# Patient Record
Sex: FEMALE | Race: WHITE | NOT HISPANIC OR LATINO | Employment: OTHER | URBAN - METROPOLITAN AREA
[De-identification: names, ages, dates, MRNs, and addresses within clinical notes are randomized per-mention and may not be internally consistent; named-entity substitution may affect disease eponyms.]

---

## 2017-09-18 ENCOUNTER — TRANSCRIBE ORDERS (OUTPATIENT)
Dept: ADMINISTRATIVE | Facility: HOSPITAL | Age: 62
End: 2017-09-18

## 2017-09-18 ENCOUNTER — HOSPITAL ENCOUNTER (OUTPATIENT)
Dept: GENERAL RADIOLOGY | Facility: HOSPITAL | Age: 62
Discharge: HOME | End: 2017-09-18
Admitting: NURSE PRACTITIONER

## 2017-09-18 DIAGNOSIS — M79.672 ACUTE FOOT PAIN, LEFT: ICD-10-CM

## 2017-09-18 DIAGNOSIS — M79.672 ACUTE FOOT PAIN, LEFT: Primary | ICD-10-CM

## 2017-09-18 PROCEDURE — 73630 X-RAY EXAM OF FOOT: CPT

## 2017-12-28 ENCOUNTER — TRANSCRIBE ORDERS (OUTPATIENT)
Dept: ADMINISTRATIVE | Facility: HOSPITAL | Age: 62
End: 2017-12-28

## 2017-12-28 ENCOUNTER — HOSPITAL ENCOUNTER (OUTPATIENT)
Dept: GENERAL RADIOLOGY | Facility: HOSPITAL | Age: 62
Discharge: HOME | End: 2017-12-28
Admitting: NURSE PRACTITIONER

## 2017-12-28 DIAGNOSIS — J40 BRONCHITIS: ICD-10-CM

## 2017-12-28 DIAGNOSIS — J40 BRONCHITIS: Primary | ICD-10-CM

## 2017-12-28 PROCEDURE — 71020 HC CHEST PA AND LATERAL: CPT

## 2018-06-08 ENCOUNTER — TRANSCRIBE ORDERS (OUTPATIENT)
Dept: ADMINISTRATIVE | Facility: HOSPITAL | Age: 63
End: 2018-06-08

## 2018-06-08 DIAGNOSIS — Z12.31 VISIT FOR SCREENING MAMMOGRAM: Primary | ICD-10-CM

## 2018-06-21 ENCOUNTER — HOSPITAL ENCOUNTER (OUTPATIENT)
Dept: MAMMOGRAPHY | Facility: HOSPITAL | Age: 63
Discharge: HOME | End: 2018-06-21
Admitting: NURSE PRACTITIONER

## 2018-06-21 DIAGNOSIS — Z12.31 VISIT FOR SCREENING MAMMOGRAM: ICD-10-CM

## 2018-06-21 PROCEDURE — 77067 SCR MAMMO BI INCL CAD: CPT | Performed by: RADIOLOGY

## 2018-06-21 PROCEDURE — 77067 SCR MAMMO BI INCL CAD: CPT

## 2018-06-21 PROCEDURE — 77063 BREAST TOMOSYNTHESIS BI: CPT

## 2018-06-21 PROCEDURE — 77063 BREAST TOMOSYNTHESIS BI: CPT | Performed by: RADIOLOGY

## 2018-09-06 ENCOUNTER — HOSPITAL ENCOUNTER (OUTPATIENT)
Dept: CT IMAGING | Facility: HOSPITAL | Age: 63
Discharge: HOME | End: 2018-09-06
Admitting: NURSE PRACTITIONER

## 2018-09-06 ENCOUNTER — TRANSCRIBE ORDERS (OUTPATIENT)
Dept: ADMINISTRATIVE | Facility: HOSPITAL | Age: 63
End: 2018-09-06

## 2018-09-06 DIAGNOSIS — R10.9 ABDOMINAL PAIN, UNSPECIFIED ABDOMINAL LOCATION: Primary | ICD-10-CM

## 2018-09-06 DIAGNOSIS — R10.9 ABDOMINAL PAIN, UNSPECIFIED ABDOMINAL LOCATION: ICD-10-CM

## 2018-09-06 PROCEDURE — 74176 CT ABD & PELVIS W/O CONTRAST: CPT

## 2019-07-31 ENCOUNTER — TRANSCRIBE ORDERS (OUTPATIENT)
Dept: ADMINISTRATIVE | Facility: HOSPITAL | Age: 64
End: 2019-07-31

## 2019-07-31 DIAGNOSIS — Z12.31 VISIT FOR SCREENING MAMMOGRAM: Primary | ICD-10-CM

## 2019-09-16 ENCOUNTER — APPOINTMENT (OUTPATIENT)
Dept: MAMMOGRAPHY | Facility: HOSPITAL | Age: 64
End: 2019-09-16

## 2019-10-31 ENCOUNTER — HOSPITAL ENCOUNTER (OUTPATIENT)
Dept: MAMMOGRAPHY | Facility: HOSPITAL | Age: 64
Discharge: HOME | End: 2019-10-31
Admitting: NURSE PRACTITIONER

## 2019-10-31 DIAGNOSIS — Z12.31 VISIT FOR SCREENING MAMMOGRAM: ICD-10-CM

## 2019-10-31 PROCEDURE — 77063 BREAST TOMOSYNTHESIS BI: CPT

## 2019-10-31 PROCEDURE — 77067 SCR MAMMO BI INCL CAD: CPT | Performed by: RADIOLOGY

## 2019-10-31 PROCEDURE — 77067 SCR MAMMO BI INCL CAD: CPT

## 2019-10-31 PROCEDURE — 77063 BREAST TOMOSYNTHESIS BI: CPT | Performed by: RADIOLOGY

## 2020-06-16 ENCOUNTER — HOSPITAL ENCOUNTER (OUTPATIENT)
Dept: GENERAL RADIOLOGY | Facility: HOSPITAL | Age: 65
Discharge: HOME | End: 2020-06-16
Admitting: NURSE PRACTITIONER

## 2020-06-16 ENCOUNTER — TRANSCRIBE ORDERS (OUTPATIENT)
Dept: ADMINISTRATIVE | Facility: HOSPITAL | Age: 65
End: 2020-06-16

## 2020-06-16 DIAGNOSIS — Z01.811 PRE-OP CHEST EXAM: Primary | ICD-10-CM

## 2020-06-16 DIAGNOSIS — Z01.811 PRE-OP CHEST EXAM: ICD-10-CM

## 2020-06-16 PROCEDURE — 71046 X-RAY EXAM CHEST 2 VIEWS: CPT

## 2021-10-28 ENCOUNTER — TRANSCRIBE ORDERS (OUTPATIENT)
Dept: ADMINISTRATIVE | Facility: HOSPITAL | Age: 66
End: 2021-10-28

## 2021-10-28 DIAGNOSIS — Z12.31 VISIT FOR SCREENING MAMMOGRAM: Primary | ICD-10-CM

## 2022-06-01 ENCOUNTER — TRANSCRIBE ORDERS (OUTPATIENT)
Dept: ADMINISTRATIVE | Facility: HOSPITAL | Age: 67
End: 2022-06-01

## 2022-06-01 DIAGNOSIS — Z12.31 VISIT FOR SCREENING MAMMOGRAM: Primary | ICD-10-CM

## 2022-06-23 ENCOUNTER — HOSPITAL ENCOUNTER (OUTPATIENT)
Dept: MAMMOGRAPHY | Facility: HOSPITAL | Age: 67
Discharge: HOME | End: 2022-06-23
Admitting: FAMILY MEDICINE

## 2022-06-23 DIAGNOSIS — Z12.31 VISIT FOR SCREENING MAMMOGRAM: ICD-10-CM

## 2022-06-23 PROCEDURE — 77063 BREAST TOMOSYNTHESIS BI: CPT | Performed by: RADIOLOGY

## 2022-06-23 PROCEDURE — 77063 BREAST TOMOSYNTHESIS BI: CPT

## 2022-06-23 PROCEDURE — 77067 SCR MAMMO BI INCL CAD: CPT | Performed by: RADIOLOGY

## 2022-06-23 PROCEDURE — 77067 SCR MAMMO BI INCL CAD: CPT

## 2022-07-19 ENCOUNTER — HOSPITAL ENCOUNTER (OUTPATIENT)
Dept: ULTRASOUND IMAGING | Facility: HOSPITAL | Age: 67
Discharge: HOME | End: 2022-07-19

## 2022-07-19 ENCOUNTER — HOSPITAL ENCOUNTER (OUTPATIENT)
Dept: MAMMOGRAPHY | Facility: HOSPITAL | Age: 67
Discharge: HOME | End: 2022-07-19

## 2022-07-19 DIAGNOSIS — R92.8 ABNORMAL MAMMOGRAM: ICD-10-CM

## 2022-07-19 PROCEDURE — 19083 BX BREAST 1ST LESION US IMAG: CPT | Performed by: RADIOLOGY

## 2022-07-19 PROCEDURE — 77065 DX MAMMO INCL CAD UNI: CPT | Performed by: RADIOLOGY

## 2022-07-19 PROCEDURE — 88360 TUMOR IMMUNOHISTOCHEM/MANUAL: CPT | Performed by: FAMILY MEDICINE

## 2022-07-19 PROCEDURE — 88342 IMHCHEM/IMCYTCHM 1ST ANTB: CPT | Performed by: FAMILY MEDICINE

## 2022-07-19 PROCEDURE — 88305 TISSUE EXAM BY PATHOLOGIST: CPT | Performed by: FAMILY MEDICINE

## 2022-07-19 PROCEDURE — A4648 IMPLANTABLE TISSUE MARKER: HCPCS

## 2022-07-19 PROCEDURE — 0 LIDOCAINE 1 % SOLUTION: Performed by: RADIOLOGY

## 2022-07-19 PROCEDURE — 88341 IMHCHEM/IMCYTCHM EA ADD ANTB: CPT | Performed by: FAMILY MEDICINE

## 2022-07-19 PROCEDURE — 76642 ULTRASOUND BREAST LIMITED: CPT

## 2022-07-19 PROCEDURE — 76642 ULTRASOUND BREAST LIMITED: CPT | Performed by: RADIOLOGY

## 2022-07-19 RX ORDER — LIDOCAINE HYDROCHLORIDE AND EPINEPHRINE 10; 10 MG/ML; UG/ML
20 INJECTION, SOLUTION INFILTRATION; PERINEURAL ONCE
Status: COMPLETED | OUTPATIENT
Start: 2022-07-19 | End: 2022-07-19

## 2022-07-19 RX ORDER — LIDOCAINE HYDROCHLORIDE 10 MG/ML
5 INJECTION, SOLUTION INFILTRATION; PERINEURAL ONCE
Status: COMPLETED | OUTPATIENT
Start: 2022-07-19 | End: 2022-07-19

## 2022-07-19 RX ADMIN — LIDOCAINE HYDROCHLORIDE AND EPINEPHRINE 3 ML: 10; 10 INJECTION, SOLUTION INFILTRATION; PERINEURAL at 10:03

## 2022-07-19 RX ADMIN — Medication 0.5 ML: at 10:02

## 2022-07-21 LAB
CYTO UR: NORMAL
LAB AP CASE REPORT: NORMAL
LAB AP CLINICAL INFORMATION: NORMAL
LAB AP DIAGNOSIS COMMENT: NORMAL
LAB AP SPECIAL STAINS: NORMAL
PATH REPORT.FINAL DX SPEC: NORMAL
PATH REPORT.GROSS SPEC: NORMAL

## 2022-07-25 ENCOUNTER — TELEPHONE (OUTPATIENT)
Dept: MAMMOGRAPHY | Facility: HOSPITAL | Age: 67
End: 2022-07-25

## 2022-07-25 NOTE — TELEPHONE ENCOUNTER
Referring provider's office notified pathology returned as cancer & patient will be notified. Patient notified of pathology results and recommendation. Verbalizes understanding. Denies discomfort. Denies signs and symptoms of infection.   Patient desires to research surgeon choice & possibly contact PCP about surgeon choice. Patient is to call back with decision; an appointment will then be scheduled and patient will be notified. Verbalizes understanding.

## 2022-07-27 ENCOUNTER — TELEPHONE (OUTPATIENT)
Dept: MAMMOGRAPHY | Facility: HOSPITAL | Age: 67
End: 2022-07-27

## 2022-08-03 ENCOUNTER — TELEPHONE (OUTPATIENT)
Dept: GENETICS | Facility: HOSPITAL | Age: 67
End: 2022-08-03

## 2022-08-03 ENCOUNTER — NURSE NAVIGATOR (OUTPATIENT)
Dept: ONCOLOGY | Facility: CLINIC | Age: 67
End: 2022-08-03

## 2022-08-03 NOTE — PROGRESS NOTES
Discussed genetic counseling and eligibility. Patient would like to proceed. Referral placed. All questions were answered and she will call with any concerns.

## 2022-08-03 NOTE — TELEPHONE ENCOUNTER
Spoke with the patient to setup a genetic appt for 8-10-22 at 1130 for a surgical decision for Dr. JUAREZ. Patient will do a blood draw at 9:15 prior to her Dr. JUAREZ appt at 9:30.

## 2022-08-09 ENCOUNTER — TELEPHONE (OUTPATIENT)
Dept: GENETICS | Facility: HOSPITAL | Age: 67
End: 2022-08-09

## 2022-08-09 NOTE — TELEPHONE ENCOUNTER
Left message asking patient to call me back regarding her family history/progeny prior to her genetic appt tomorrow.

## 2022-08-10 ENCOUNTER — TRANSCRIBE ORDERS (OUTPATIENT)
Dept: OCCUPATIONAL THERAPY | Facility: HOSPITAL | Age: 67
End: 2022-08-10

## 2022-08-10 ENCOUNTER — HOSPITAL ENCOUNTER (OUTPATIENT)
Dept: OCCUPATIONAL THERAPY | Facility: HOSPITAL | Age: 67
Setting detail: THERAPIES SERIES
Discharge: HOME | End: 2022-08-10

## 2022-08-10 ENCOUNTER — TRANSCRIBE ORDERS (OUTPATIENT)
Dept: LAB | Facility: HOSPITAL | Age: 67
End: 2022-08-10

## 2022-08-10 ENCOUNTER — CLINICAL SUPPORT (OUTPATIENT)
Dept: GENETICS | Facility: HOSPITAL | Age: 67
End: 2022-08-10

## 2022-08-10 ENCOUNTER — LAB (OUTPATIENT)
Dept: LAB | Facility: HOSPITAL | Age: 67
End: 2022-08-10

## 2022-08-10 DIAGNOSIS — Z80.49 FAMILY HISTORY OF MALIGNANT NEOPLASM OF UTERUS: ICD-10-CM

## 2022-08-10 DIAGNOSIS — C50.911 MALIGNANT NEOPLASM OF RIGHT BREAST IN FEMALE, ESTROGEN RECEPTOR POSITIVE, UNSPECIFIED SITE OF BREAST: Primary | ICD-10-CM

## 2022-08-10 DIAGNOSIS — Z13.79 GENETIC TESTING: Primary | ICD-10-CM

## 2022-08-10 DIAGNOSIS — C50.911 MALIGNANT NEOPLASM OF RIGHT FEMALE BREAST, UNSPECIFIED ESTROGEN RECEPTOR STATUS, UNSPECIFIED SITE OF BREAST: Primary | ICD-10-CM

## 2022-08-10 DIAGNOSIS — Z80.3 FAMILY HISTORY OF MALIGNANT NEOPLASM OF BREAST: ICD-10-CM

## 2022-08-10 DIAGNOSIS — Z17.0 MALIGNANT NEOPLASM OF RIGHT BREAST IN FEMALE, ESTROGEN RECEPTOR POSITIVE, UNSPECIFIED SITE OF BREAST: Primary | ICD-10-CM

## 2022-08-10 DIAGNOSIS — C50.911 MALIGNANT NEOPLASM OF RIGHT BREAST IN FEMALE, ESTROGEN RECEPTOR POSITIVE, UNSPECIFIED SITE OF BREAST: ICD-10-CM

## 2022-08-10 DIAGNOSIS — Z80.0 FAMILY HISTORY OF COLON CANCER: ICD-10-CM

## 2022-08-10 DIAGNOSIS — Z17.0 MALIGNANT NEOPLASM OF RIGHT BREAST IN FEMALE, ESTROGEN RECEPTOR POSITIVE, UNSPECIFIED SITE OF BREAST: ICD-10-CM

## 2022-08-10 PROCEDURE — 96040: CPT | Performed by: GENETIC COUNSELOR, MS

## 2022-08-10 NOTE — PROGRESS NOTES
Carol Nayak, a 66-year-old female, was seen for genetic counseling due to a personal history of breast cancer. Ms. Nayak was recently diagnosed with a right breast cancer at age 66. She is in the process of making a surgical decision. Ms. Nayak retains her uterus and ovaries. She has colonoscopies every five years and reports having a couple of polyps removed. She was interested in discussing her risk for a hereditary cancer syndrome.  Ms. Nayak was interested in pursuing a multi-gene panel to evaluate her risk of cancer, therefore the CancerNext panel was ordered through [facility] which analyzes BRCA1/2 and 34 additional genes associated with an increased cancer risk. Results from the high/moderate risk breast cancer genes are expected in 10 days, and results from the remainder of the panel are expected in 2-3 weeks.    PERTINENT FAMILY HISTORY: (See attached pedigree)   Sister:   Breast cancer, 40  Sister:   Breast cancer, 69  Mother:  Colon cancer, 58  Mat. Grandmother: Uterine cancer, 91  Pat. Grandmother: Lung cancer    Records regarding the family history were not provided for review.     RISK ASSESSMENT:  Ms. Nayak’s personal and family history of cancer led to concern for a hereditary cancer syndrome. We discussed BRCA1/2 testing as well as the option of pursuing a panel that would test for other genes known to impact cancer risk in addition to BRCA1/2.   Ms. Nayak clearly meets NCCN guidelines criteria for BRCA1/2 testing based on her personal and family history of breast cancer.     GENETIC COUNSELING: (30 minutes) We reviewed the family history information in detail. Cases of cancer follow three general patterns: sporadic, familial, and hereditary.  While most cancer is sporadic, some cases appear to occur in family clusters.  These cases are said to be familial and account for 10-20% of cancer cases.  Familial cases may be due to a combination of shared genes and environmental factors among  family members.  In even fewer families (5-10%), the cancer is said to be inherited, and the genes responsible for the cancer are known.      Family histories typical of hereditary cancer syndromes usually include multiple first- and second-degree relatives diagnosed with cancer types that define a syndrome.  These cases tend to be diagnosed at younger-than-expected ages and can be bilateral or multifocal.  The cancer in these families follows an autosomal dominant inheritance pattern, which indicates the likely presence of a mutation in a cancer susceptibility gene.  Children and siblings of an individual believed to carry this mutation have a 50% chance of inheriting that mutation, thereby inheriting the increased risk to develop cancer.  These mutations can be passed down from the maternal or the paternal lineage.    Hereditary breast cancer accounts for 5-10% of all cases of breast cancer.  A significant proportion of hereditary breast and ovarian cancer can be attributed to mutations in the BRCA1 and BRCA2 genes.  Mutations in these genes confer an increased risk for breast cancer, ovarian cancer, male breast cancer, prostate cancer, and pancreatic cancer. Women with a BRCA1 or BRCA2 mutation who have already been diagnosed with breast cancer have a 40-60% lifetime risk of a second breast cancer. Women with a BRCA1 or BRCA2 mutation have up to a 44% risk of ovarian cancer.      There are other genes that are known to be associated with an increased risk for cancer.  Some of these genes have well defined cancer risks and established management guidelines.  Other genes that can be tested for have been more recently described, and there may be less data regarding the risks and therefore may not have established management guidelines. We discussed these limitations at length.  Based on Ms. Nayak’s desire to get as much information as possible regarding her personal risks and potential risks for her family, she  opted to pursue testing through a panel that would look at several other genes known to increase the risk for cancer.    GENETIC TESTING:  The risks, benefits and limitations of genetic testing and implications for clinical management following testing were reviewed.  DNA test results can influence decisions regarding screening, prevention and surgical management.  Genetic testing can have significant psychological implications for both individuals and families.  Also discussed was the possibility of employment and insurance discrimination based on genetic test results and the laws in place to prevent this (GLENDA).    We discussed panel testing, which would involve testing for BRCA1/2 as well as 34 additional genes that are associated with increased cancer risk. The benefits and limitations of genetic testing were discussed and Ms. Nayak decided to pursue testing via the panel. The implications of a positive or negative test result were discussed. We discussed the possibility that, in some cases, genetic test results may be informative or may be ambiguous due to the identification of a genetic variant. These variants may or may not be associated with an increased cancer risk.  With multigene panel testing, it is not uncommon for a variant of uncertain significance (VUS) to be identified.  If a VUS is identified, testing family members is typically not recommended and screening recommendations are made based on the family history.  The laboratories that perform genetic testing work to reclassify the VUS and send out an amended report if and when a VUS is reclassified.  The majority of variant findings are ultimately reclassified to a negative result.  Given her personal and family history, a negative test result would not eliminate all cancer risk to her relatives, although the risk would not be as high as it would with positive genetic testing.      PLAN: Genetic testing via the CancerPrintLess Planst panel through [facility] was ordered. Results from the high/moderate risk breast cancer genes are expected in 7-10 days, and results from the remainder of the panel are expected in 2-3 weeks. Ms. Nayak is welcome to contact us in the meantime with any questions she may have at [phone].      Madina Rocha MS, [facility] [place], [facility]  Licensed Certified Genetic Counselor

## 2022-08-11 DIAGNOSIS — Z17.0 MALIGNANT NEOPLASM OF RIGHT BREAST IN FEMALE, ESTROGEN RECEPTOR POSITIVE, UNSPECIFIED SITE OF BREAST: Primary | ICD-10-CM

## 2022-08-11 DIAGNOSIS — C50.911 MALIGNANT NEOPLASM OF RIGHT BREAST IN FEMALE, ESTROGEN RECEPTOR POSITIVE, UNSPECIFIED SITE OF BREAST: Primary | ICD-10-CM

## 2022-08-15 ENCOUNTER — HOSPITAL ENCOUNTER (OUTPATIENT)
Dept: MRI IMAGING | Facility: HOSPITAL | Age: 67
Discharge: HOME | End: 2022-08-15
Admitting: SURGERY

## 2022-08-15 ENCOUNTER — DOCUMENTATION (OUTPATIENT)
Dept: GENETICS | Facility: HOSPITAL | Age: 67
End: 2022-08-15

## 2022-08-15 DIAGNOSIS — C50.411 MALIGNANT NEOPLASM OF UPPER-OUTER QUADRANT OF RIGHT FEMALE BREAST: ICD-10-CM

## 2022-08-15 LAB — CREAT BLDA-MCNC: 0.9 MG/DL (ref 0.6–1.3)

## 2022-08-15 PROCEDURE — 0 GADOBENATE DIMEGLUMINE 529 MG/ML SOLUTION: Performed by: SURGERY

## 2022-08-15 PROCEDURE — A9577 INJ MULTIHANCE: HCPCS | Performed by: SURGERY

## 2022-08-15 PROCEDURE — 77049 MRI BREAST C-+ W/CAD BI: CPT | Performed by: RADIOLOGY

## 2022-08-15 PROCEDURE — C8937 CAD BREAST MRI: HCPCS

## 2022-08-15 PROCEDURE — C8908 MRI W/O FOL W/CONT, BREAST,: HCPCS

## 2022-08-15 PROCEDURE — 82565 ASSAY OF CREATININE: CPT

## 2022-08-15 RX ADMIN — GADOBENATE DIMEGLUMINE 14 ML: 529 INJECTION, SOLUTION INTRAVENOUS at 09:49

## 2022-08-15 NOTE — PROGRESS NOTES
Carol Nayak, a 66-year-old female, was seen for genetic counseling due to a personal history of breast cancer. Ms. Nayak was recently diagnosed with a right breast cancer at age 66. She is in the process of making a surgical decision. Ms. Nayak retains her uterus and ovaries. She has colonoscopies every five years and reports having a couple of colon polyps removed. She was interested in discussing her risk for a hereditary cancer syndrome.  Ms. Nayak was interested in pursuing a multi-gene panel to evaluate her risk of cancer, therefore the CancerNext panel was ordered through [facility] [facility] [employer] which analyzes BRCA1/2 and 34 additional genes associated with an increased cancer risk. The genes on this panel include APC, LEXX, AXIN2, BARD1, BMPR1A, BRCA1, BRCA2, BRIP1, CDH1, CDK4, CDKN2A, CHEK2, DICER1, EPCAM, GREM1, HOXB13, MLH1, MSH2, MSH3, MSH6, MUTYH, NBN, NF1, NTHL1, PALB2, PMS2, POLD1, POLE, PTEN, RAD51C, RAD51D, RECQL, SMAD4, SMARCA4, STK11, and TP53.  Results from the BRCAPlus panel, the high/moderate breast cancer risk portion of the panel, were resulted out first in order to expedite surgical decision making. Testing was positive for a pathogenic mutation in the BRCA2 gene ([id number]). These results were discussed with Ms. Nayak by telephone on 8/15/2022. The remainder of the CancerNext panel was negative for any additional variants, and these results were discussed with Ms. Nayak on 8/23/2022.    PERTINENT FAMILY HISTORY: (See attached pedigree)   Sister:   Breast cancer, 40  Sister:   Breast cancer, 69  Mother:  Colon cancer, 58  Mat. Grandmother: Uterine cancer, 91  Pat. Grandmother: Lung cancer    Records regarding the family history were not provided for review.     RISK ASSESSMENT:  Ms. Nayak’s personal and family history of cancer led to concern for a hereditary cancer syndrome. We discussed BRCA1/2 testing as well as the option of pursuing a panel that would test for other genes known to impact  cancer risk in addition to BRCA1/2.   Ms. Nayak clearly meets NCCN guidelines criteria for BRCA1/2 testing based on her personal and family history of breast cancer.     GENETIC COUNSELING: We reviewed the family history information in detail. Cases of cancer follow three general patterns: sporadic, familial, and hereditary.  While most cancer is sporadic, some cases appear to occur in family clusters.  These cases are said to be familial and account for 10-20% of cancer cases.  Familial cases may be due to a combination of shared genes and environmental factors among family members.  In even fewer families (5-10%), the cancer is said to be inherited, and the genes responsible for the cancer are known.      Family histories typical of hereditary cancer syndromes usually include multiple first- and second-degree relatives diagnosed with cancer types that define a syndrome.  These cases tend to be diagnosed at younger-than-expected ages and can be bilateral or multifocal.  The cancer in these families follows an autosomal dominant inheritance pattern, which indicates the likely presence of a mutation in a cancer susceptibility gene.  Children and siblings of an individual believed to carry this mutation have a 50% chance of inheriting that mutation, thereby inheriting the increased risk to develop cancer.  These mutations can be passed down from the maternal or the paternal lineage.    Hereditary breast cancer accounts for 5-10% of all cases of breast cancer.  A significant proportion of hereditary breast and ovarian cancer can be attributed to mutations in the BRCA1 and BRCA2 genes.  Mutations in these genes confer an increased risk for breast cancer, ovarian cancer, male breast cancer, prostate cancer, and pancreatic cancer. Women with a BRCA1 or BRCA2 mutation who have already been diagnosed with breast cancer have a 40-60% lifetime risk of a second breast cancer. Women with a BRCA1 or BRCA2 mutation have up to a  44% risk of ovarian cancer.      There are other genes that are known to be associated with an increased risk for cancer.  Some of these genes have well defined cancer risks and established management guidelines.  Other genes that can be tested for have been more recently described, and there may be less data regarding the risks and therefore may not have established management guidelines. We discussed these limitations at length.  Based on Ms. Nayak’s desire to get as much information as possible regarding her personal risks and potential risks for her family, she opted to pursue testing through a panel that would look at several other genes known to increase the risk for cancer.    GENETIC TESTING:  The risks, benefits and limitations of genetic testing and implications for clinical management following testing were reviewed.  DNA test results can influence decisions regarding screening, prevention and surgical management.  Genetic testing can have significant psychological implications for both individuals and families.  Also discussed was the possibility of employment and insurance discrimination based on genetic test results and the laws in place to prevent this (GLENDA).    We discussed panel testing, which would involve testing for BRCA1/2 as well as 34 additional genes that are associated with increased cancer risk. The benefits and limitations of genetic testing were discussed and Ms. Nayak decided to pursue testing via the panel. The implications of a positive or negative test result were discussed. We discussed the possibility that, in some cases, genetic test results may be informative or may be ambiguous due to the identification of a genetic variant. These variants may or may not be associated with an increased cancer risk.  With multigene panel testing, it is not uncommon for a variant of uncertain significance (VUS) to be identified.  If a VUS is identified, testing family members is typically not  recommended and screening recommendations are made based on the family history.  The laboratories that perform genetic testing work to reclassify the VUS and send out an amended report if and when a VUS is reclassified.  The majority of variant findings are ultimately reclassified to a negative result.  Given her personal and family history, a negative test result would not eliminate all cancer risk to her relatives, although the risk would not be as high as it would with positive genetic testing.      TEST RESULTS: Testing identified a pathogenic mutation in the BRCA2 gene ([id number]). This is causative of Hereditary Breast and Ovarian Cancer syndrome (HBOC). Genetic testing for the specific BRCA2 mutation is indicated for other family members to determine whether they have inherited this risk factor. Siblings and children of an individual who has a BRCA2 mutation are at a 50% chance to have inherited the familial BRCA2 mutation. Testing is available to individuals age 18 or older.   Ms. Nayak would need to provide relatives with a copy of her genetic test results so that appropriate testing can be ordered for the specific familial mutation.     Until each at-risk family member has been proven not to carry this mutation in BRCA2, he or she should be offered increased surveillance.  We would be happy to see family members in our clinic to further discuss this information and testing options. For family members who live elsewhere, there are genetic counselors at most [facility] centers.  They can find a genetic counselor by visiting the National Society of Genetic Counselors website at www.nsgc.org or they can call our office and we would be happy to give them the contact information of the closest genetic counselor.     CANCER RISK: Mutations in BRCA2 confer a 56-84% lifetime risk of breast cancer, a 10-27% lifetime risk of ovarian cancer, and a 40-60% lifetime risk of a second breast cancer in someone who has  already been diagnosed with breast cancer. Additionally, there is as high as a 7% risk for male breast cancer and a 20% risk for prostate cancer.  BRCA2 mutations have also been associated with increased risks for other types of cancer, including pancreatic cancer (up to 7% lifetime risk), and ocular and cutaneous melanoma (<5% lifetime risk).    CANCER SCREENING:  Options available to individuals with a BRCA2 mutation and at high risk for breast and ovarian cancer were discussed, including increased surveillance, chemoprevention and prophylactic surgery (mastectomy and/or oophorectomy).      For women with a BRCA2 mutation who choose not to undergo bilateral mastectomy, increased breast cancer surveillance is warranted. Increased surveillance, based on NCCN guidelines, would consist of semi-annual clinical breast exam and monthly self-breast exam starting at age 18, annual breast MRI starting at age 25, and annual mammography and breast MRI starting at age 30. For women with a BRCA2 mutation who have not had a breast cancer diagnosis, breast cancer chemoprevention is another option available.  Studies have shown that Tamoxifen and Raloxifene can cut the risk of estrogen receptor positive breast cancer by 50% when taken by high-risk women.  There are risks associated with these medications; therefore, the risks versus benefits must be considered prior to deciding to take chemopreventative medications.     Current data does not support routine ovarian cancer screening.  We briefly discussed transvaginal ultrasound and serum CA-125, however neither has been found to be sufficiently sensitive or specific to be recommended for individuals that have an increased risk for ovarian cancer.  Some physicians consider offering screening between ages 30-35, before a patient is at an age where BSO is typically offered.      Screening for males with BRCA2 mutations should include prostate cancer screening beginning at age 40,  self-breast exam, and clinical breast exam beginning at 35.  Mammography can be considered in males with gynecomastia.  Given the risk for melanoma, annual skin and eye examinations should be considered for both males and females.  The importance of monthly self-skin exams was emphasized, as well as diligence in following up with a clinician when a mole or suspicious skin lesion is identified. There are no standardized screening tests that have been proven to be effective in early pancreatic cancer detection.  In the absence of a family history of pancreatic cancer, there are not typically screening recommendations made.  In cases where an individual has a BRCA2 mutation and family history of pancreatic cancer some experts consider screening with endoscopic ultrasound, high-resolution pancreatic protocol CT, or MRI, starting at age 50 or 10 years younger than the earliest diagnosis of pancreas cancer in the family.  Since these screening methods are not standard of care, consideration of referral to a clinical research screening program is appropriate if a patient wishes to pursue screening.  Of note, Ms. Nayak does not have a family history of pancreatic cancer.     SURGICAL OPTIONS:  Risk-reducing bilateral mastectomy has been shown to reduce the risk of breast cancer by approximately 90%.  Risk-reducing bilateral salpingo-oopherectomy (BSO) has been shown to reduce the risk of ovarian cancer by as much as 96%. It has been suggested that risk-reducing BSO in high-risk women be done by a surgeon with experience in this population, such as a gynecologic oncologist.  Careful removal of the fallopian tubes is essential, due to the residual risk for fallopian tube cancer in BRCA positive patients. Additionally, 2-10% of BRCA positive patients are found to have ovarian cancer at the time of BSO; therefore, careful pathologic exam of the ovaries by serial sectioning is recommended.  In addition, cytologic evaluation of  peritoneal washings could be considered.     PLAN:  This information was discussed in detail by telephone and a copy of this note is being mailed to Ms. Nayak.  She is encouraged to contact our office if she would like to schedule a follow-up appointment or phone call to review this information further.  Ms. Nayak is in the process of making a surgical decision regarding her recent breast cancer diagnosis.  Ms. Nayka was encouraged to call me at [phone] if she has any questions.       Madina Rocha MS, [facility] [place] [place]  Licensed Certified Genetic Counselor       Cc: Carol Gay MD

## 2022-08-16 ENCOUNTER — TELEPHONE (OUTPATIENT)
Dept: MRI IMAGING | Facility: HOSPITAL | Age: 67
End: 2022-08-16

## 2022-08-18 ENCOUNTER — NURSE NAVIGATOR (OUTPATIENT)
Dept: ONCOLOGY | Facility: CLINIC | Age: 67
End: 2022-08-18

## 2022-08-18 NOTE — PROGRESS NOTES
Returned patient's call regarding testing and imaging. Patient informed Dr. JUAREZ is out this week but he will contact her next week to develop a plan for surgery. Patient is agreeable with this plan.

## 2022-08-22 ENCOUNTER — TRANSCRIBE ORDERS (OUTPATIENT)
Dept: MAMMOGRAPHY | Facility: HOSPITAL | Age: 67
End: 2022-08-22

## 2022-08-22 DIAGNOSIS — C50.411 MALIGNANT NEOPLASM OF UPPER-OUTER QUADRANT OF RIGHT FEMALE BREAST, UNSPECIFIED ESTROGEN RECEPTOR STATUS: Primary | ICD-10-CM

## 2022-08-23 ENCOUNTER — TELEPHONE (OUTPATIENT)
Dept: GENETICS | Facility: HOSPITAL | Age: 67
End: 2022-08-23

## 2022-08-23 ENCOUNTER — TRANSCRIBE ORDERS (OUTPATIENT)
Dept: MAMMOGRAPHY | Facility: HOSPITAL | Age: 67
End: 2022-08-23

## 2022-08-23 DIAGNOSIS — C50.411 MALIGNANT NEOPLASM OF UPPER-OUTER QUADRANT OF RIGHT FEMALE BREAST, UNSPECIFIED ESTROGEN RECEPTOR STATUS: Primary | ICD-10-CM

## 2022-08-23 NOTE — TELEPHONE ENCOUNTER
Called patient to disclose results from remainder of genetic testing. The BRCA2 mutation that was first identified on the BRCAplus panel is the only mutation identified. Results from the remainder of the CancerNext panel genes did not identify any additional mutations or variants. Full copy of summary letter, pedigree, and results are viewable to patient in [employer] and will be mailed to her. A copy will also be routed to Dr. JUAREZ.     Madina Rocha, MS, [facility] [place], [facility]

## 2022-08-24 ENCOUNTER — TRANSCRIBE ORDERS (OUTPATIENT)
Dept: ADMINISTRATIVE | Facility: HOSPITAL | Age: 67
End: 2022-08-24

## 2022-08-24 DIAGNOSIS — C50.411 MALIGNANT NEOPLASM OF UPPER-OUTER QUADRANT OF RIGHT FEMALE BREAST, UNSPECIFIED ESTROGEN RECEPTOR STATUS: Primary | ICD-10-CM

## 2022-08-31 ENCOUNTER — LAB (OUTPATIENT)
Dept: LAB | Facility: HOSPITAL | Age: 67
End: 2022-08-31

## 2022-08-31 ENCOUNTER — APPOINTMENT (OUTPATIENT)
Dept: MAMMOGRAPHY | Facility: HOSPITAL | Age: 67
End: 2022-08-31

## 2022-08-31 ENCOUNTER — TRANSCRIBE ORDERS (OUTPATIENT)
Dept: LAB | Facility: HOSPITAL | Age: 67
End: 2022-08-31

## 2022-08-31 DIAGNOSIS — Z01.812 PRE-OPERATIVE LABORATORY EXAMINATION: ICD-10-CM

## 2022-08-31 DIAGNOSIS — Z01.812 PRE-OPERATIVE LABORATORY EXAMINATION: Primary | ICD-10-CM

## 2022-08-31 LAB
BASOPHILS # BLD AUTO: 0.05 10*3/MM3 (ref 0–0.2)
BASOPHILS NFR BLD AUTO: 0.7 % (ref 0–1.5)
DEPRECATED RDW RBC AUTO: 40.5 FL (ref 37–54)
EOSINOPHIL # BLD AUTO: 0.34 10*3/MM3 (ref 0–0.4)
EOSINOPHIL NFR BLD AUTO: 4.8 % (ref 0.3–6.2)
ERYTHROCYTE [DISTWIDTH] IN BLOOD BY AUTOMATED COUNT: 12.9 % (ref 12.3–15.4)
HCT VFR BLD AUTO: 41.8 % (ref 34–46.6)
HGB BLD-MCNC: 14.1 G/DL (ref 12–15.9)
IMM GRANULOCYTES # BLD AUTO: 0.03 10*3/MM3 (ref 0–0.05)
IMM GRANULOCYTES NFR BLD AUTO: 0.4 % (ref 0–0.5)
LYMPHOCYTES # BLD AUTO: 2.05 10*3/MM3 (ref 0.7–3.1)
LYMPHOCYTES NFR BLD AUTO: 28.8 % (ref 19.6–45.3)
MCH RBC QN AUTO: 28.8 PG (ref 26.6–33)
MCHC RBC AUTO-ENTMCNC: 33.7 G/DL (ref 31.5–35.7)
MCV RBC AUTO: 85.3 FL (ref 79–97)
MONOCYTES # BLD AUTO: 0.84 10*3/MM3 (ref 0.1–0.9)
MONOCYTES NFR BLD AUTO: 11.8 % (ref 5–12)
NEUTROPHILS NFR BLD AUTO: 3.82 10*3/MM3 (ref 1.7–7)
NEUTROPHILS NFR BLD AUTO: 53.5 % (ref 42.7–76)
NRBC BLD AUTO-RTO: 0 /100 WBC (ref 0–0.2)
PLATELET # BLD AUTO: 214 10*3/MM3 (ref 140–450)
PMV BLD AUTO: 11.1 FL (ref 6–12)
RBC # BLD AUTO: 4.9 10*6/MM3 (ref 3.77–5.28)
WBC NRBC COR # BLD: 7.13 10*3/MM3 (ref 3.4–10.8)

## 2022-08-31 PROCEDURE — 80053 COMPREHEN METABOLIC PANEL: CPT

## 2022-08-31 PROCEDURE — 85025 COMPLETE CBC W/AUTO DIFF WBC: CPT

## 2022-08-31 PROCEDURE — 36415 COLL VENOUS BLD VENIPUNCTURE: CPT

## 2022-09-01 LAB
ALBUMIN SERPL-MCNC: 3.9 G/DL (ref 3.5–5.2)
ALBUMIN/GLOB SERPL: 1.3 G/DL
ALP SERPL-CCNC: 143 U/L (ref 39–117)
ALT SERPL W P-5'-P-CCNC: 22 U/L (ref 1–33)
ANION GAP SERPL CALCULATED.3IONS-SCNC: 7.1 MMOL/L (ref 5–15)
AST SERPL-CCNC: 21 U/L (ref 1–32)
BILIRUB SERPL-MCNC: 0.3 MG/DL (ref 0–1.2)
BUN SERPL-MCNC: 16 MG/DL (ref 8–23)
BUN/CREAT SERPL: 17.8 (ref 7–25)
CALCIUM SPEC-SCNC: 9.2 MG/DL (ref 8.6–10.5)
CHLORIDE SERPL-SCNC: 100 MMOL/L (ref 98–107)
CO2 SERPL-SCNC: 28.9 MMOL/L (ref 22–29)
CREAT SERPL-MCNC: 0.9 MG/DL (ref 0.57–1)
EGFRCR SERPLBLD CKD-EPI 2021: 70.7 ML/MIN/1.73
GLOBULIN UR ELPH-MCNC: 3.1 GM/DL
GLUCOSE SERPL-MCNC: 73 MG/DL (ref 65–99)
POTASSIUM SERPL-SCNC: 4.9 MMOL/L (ref 3.5–5.2)
PROT SERPL-MCNC: 7 G/DL (ref 6–8.5)
SODIUM SERPL-SCNC: 136 MMOL/L (ref 136–145)

## 2022-09-09 ENCOUNTER — LAB REQUISITION (OUTPATIENT)
Dept: LAB | Facility: HOSPITAL | Age: 67
End: 2022-09-09

## 2022-09-09 ENCOUNTER — HOSPITAL ENCOUNTER (OUTPATIENT)
Dept: MAMMOGRAPHY | Facility: HOSPITAL | Age: 67
Discharge: HOME | End: 2022-09-09
Admitting: SURGERY

## 2022-09-09 ENCOUNTER — HOSPITAL ENCOUNTER (OUTPATIENT)
Dept: NUCLEAR MEDICINE | Facility: HOSPITAL | Age: 67
Discharge: HOME | End: 2022-09-09

## 2022-09-09 DIAGNOSIS — C50.411 MALIGNANT NEOPLASM OF UPPER-OUTER QUADRANT OF RIGHT FEMALE BREAST, UNSPECIFIED ESTROGEN RECEPTOR STATUS: ICD-10-CM

## 2022-09-09 DIAGNOSIS — C50.411 MALIGNANT NEOPLASM OF UPPER-OUTER QUADRANT OF RIGHT FEMALE BREAST: ICD-10-CM

## 2022-09-09 PROCEDURE — 76098 X-RAY EXAM SURGICAL SPECIMEN: CPT

## 2022-09-09 PROCEDURE — 0 TECHNETIUM FILTERED SULFUR COLLOID: Performed by: SURGERY

## 2022-09-09 PROCEDURE — 38792 RA TRACER ID OF SENTINL NODE: CPT

## 2022-09-09 PROCEDURE — 76098 X-RAY EXAM SURGICAL SPECIMEN: CPT | Performed by: RADIOLOGY

## 2022-09-09 PROCEDURE — 88307 TISSUE EXAM BY PATHOLOGIST: CPT | Performed by: SURGERY

## 2022-09-09 PROCEDURE — A9541 TC99M SULFUR COLLOID: HCPCS | Performed by: SURGERY

## 2022-09-09 RX ADMIN — TECHNETIUM TC 99M SULFUR COLLOID 1 DOSE: KIT at 10:30

## 2022-09-11 NOTE — PROGRESS NOTES
"  Subjective     PROBLEM LIST:  1. ER+ (100%) FL+ (100%) Her2 negative (0+) invasive ductal carcinoma of the right breast  A) right breast lumpectomy on 9/9/22.  Pathology showed a 1.4 cm intermediate grade IDC.  0/1 SLN involved  2. BRCA2 mutation    CHIEF COMPLAINT: breast cancer      HISTORY OF PRESENT ILLNESS:  The patient is a 66 y.o. female, referred for evaluation of a recently diagnosed breast cancer.    She has a family history of breast cancer in 2 sisters, ages 40 and 69.  She says that her sister who was diagnosed more recently had genetic testing and the results were indeterminant.  She has tested positive for a BRCA2 mutation.    She says that she decided to have a lumpectomy.  She decided that if she has a recurrence of breast cancer in the breast that she will have a mastectomy at that time.  One of her sisters passed away a few months ago from a pulmonary embolism after a knee replacement, and this also factored into her surgery decision making.    REVIEW OF SYSTEMS:  A 14 point review of systems was performed and is negative except as noted above.    Past Medical History:   Diagnosis Date   • Brain cancer (HCC)    • Breast cancer (HCC)            Objective     /69   Pulse 78   Temp 98.2 °F (36.8 °C)   Resp 16   Ht 160 cm (63\")   Wt 79.4 kg (175 lb)   LMP  (LMP Unknown)   BMI 31.00 kg/m²   Performance Status:  ECOG score: 0           General: well appearing female in no acute distress  Neuro: alert and oriented  HEENT: sclerae anicteric, oropharynx clear  Extremities: no lower extremity edema  Skin: no rashes, lesions, bruising, or petechiae  Psych: mood and affect appropriate    Lab Results   Component Value Date    WBC 7.13 08/31/2022    HGB 14.1 08/31/2022    HCT 41.8 08/31/2022    MCV 85.3 08/31/2022     08/31/2022     Lab Results   Component Value Date    GLUCOSE 73 08/31/2022    BUN 16 08/31/2022    CREATININE 0.90 08/31/2022    BCR 17.8 08/31/2022    K 4.9 08/31/2022    " CO2 28.9 08/31/2022    CALCIUM 9.2 08/31/2022    ALBUMIN 3.90 08/31/2022    AST 21 08/31/2022    ALT 22 08/31/2022                 ASSESSMENT AND PLAN:     Carol Nayak is a 66 y.o. female with a stage IA ER+ Her2 negative IDC of the right breast.    I spoke with pathology.  The tumor size and the template portion of her pathology report is listed as 4 cm.  The pathologist states that this is a typo and that the actual tumor size is 1.4 cm.  The report will be amended.    We discussed the use of the Oncotype recurrence score.  This test provides information about the biology and risk of recurrence for ER positive Her2-negative breast cancers.  It is clear from previous studies that patients who have a low risk Oncotype do not benefit from adjuvant chemotherapy.  Conversely, patients with a high risk Oncotype can have a significant benefit from adjuvant chemotherapy.  Scores in the intermediate risk group may have a small benefit based on the patient's age.  We discussed that the Oncotype test is most useful if chemotherapy is an option that the patient is considering.     Regardless of the decision about chemotherapy, she is a candidate for adjuvant endocrine therapy with an aromatase ihibitor.  We reviewed the potential side effects of anastrozole including hot flashes, vaginal dryness, joint pain, decrease in bone density, and mood or sleep disturbance.    She plans on receiving adjuvant radiotherapy.      BRCA2 mutation: We discussed annual breast MRI in addition to mammogram.  Endocrine therapy can help to reduce the risk of subsequent breast cancer.  Bilateral oophorectomy could be considered.  Annual skin exam is recommended as well.  She has no family history of pancreatic cancer.  Will refer to high risk clinic.     F/u 3 weeks to review oncotype result.    A total greater than 60 mins minutes was spent in face to face patient time, examination, counseling, charting, reviewing test results, and reviewing  outside records.    Amanda Solano MD    9/14/2022

## 2022-09-14 ENCOUNTER — CONSULT (OUTPATIENT)
Dept: ONCOLOGY | Facility: CLINIC | Age: 67
End: 2022-09-14

## 2022-09-14 ENCOUNTER — HOSPITAL ENCOUNTER (OUTPATIENT)
Dept: RADIATION ONCOLOGY | Facility: HOSPITAL | Age: 67
Setting detail: RADIATION/ONCOLOGY SERIES
Discharge: HOME | End: 2022-09-14

## 2022-09-14 ENCOUNTER — OFFICE VISIT (OUTPATIENT)
Dept: RADIATION ONCOLOGY | Facility: HOSPITAL | Age: 67
End: 2022-09-14

## 2022-09-14 VITALS
BODY MASS INDEX: 30.9 KG/M2 | HEART RATE: 78 BPM | SYSTOLIC BLOOD PRESSURE: 117 MMHG | DIASTOLIC BLOOD PRESSURE: 69 MMHG | WEIGHT: 174.4 LBS | HEIGHT: 63 IN | RESPIRATION RATE: 16 BRPM

## 2022-09-14 VITALS
SYSTOLIC BLOOD PRESSURE: 117 MMHG | WEIGHT: 175 LBS | DIASTOLIC BLOOD PRESSURE: 69 MMHG | HEIGHT: 63 IN | RESPIRATION RATE: 16 BRPM | HEART RATE: 78 BPM | TEMPERATURE: 98.2 F | BODY MASS INDEX: 31.01 KG/M2

## 2022-09-14 DIAGNOSIS — C50.411 MALIGNANT NEOPLASM OF UPPER-OUTER QUADRANT OF RIGHT BREAST IN FEMALE, ESTROGEN RECEPTOR POSITIVE: Primary | ICD-10-CM

## 2022-09-14 DIAGNOSIS — Z15.01 BRCA2 GENE MUTATION POSITIVE IN FEMALE: Primary | ICD-10-CM

## 2022-09-14 DIAGNOSIS — Z17.0 MALIGNANT NEOPLASM OF UPPER-OUTER QUADRANT OF RIGHT BREAST IN FEMALE, ESTROGEN RECEPTOR POSITIVE: Primary | ICD-10-CM

## 2022-09-14 DIAGNOSIS — Z15.09 BRCA2 GENE MUTATION POSITIVE IN FEMALE: Primary | ICD-10-CM

## 2022-09-14 DIAGNOSIS — Z15.02 BRCA2 GENE MUTATION POSITIVE IN FEMALE: Primary | ICD-10-CM

## 2022-09-14 PROCEDURE — 99205 OFFICE O/P NEW HI 60 MIN: CPT | Performed by: INTERNAL MEDICINE

## 2022-09-14 RX ORDER — LORATADINE 10 MG/1
TABLET ORAL
COMMUNITY

## 2022-09-14 RX ORDER — VENLAFAXINE HYDROCHLORIDE 75 MG/1
75 CAPSULE, EXTENDED RELEASE ORAL DAILY
COMMUNITY
Start: 2022-06-23 | End: 2023-03-02 | Stop reason: DRUGHIGH

## 2022-09-14 RX ORDER — ROPINIROLE 0.25 MG/1
TABLET, FILM COATED ORAL
COMMUNITY
Start: 2022-08-11

## 2022-09-15 PROBLEM — C50.411 MALIGNANT NEOPLASM OF UPPER-OUTER QUADRANT OF RIGHT BREAST IN FEMALE, ESTROGEN RECEPTOR POSITIVE: Status: ACTIVE | Noted: 2022-09-15

## 2022-09-15 PROBLEM — Z17.0 MALIGNANT NEOPLASM OF UPPER-OUTER QUADRANT OF RIGHT BREAST IN FEMALE, ESTROGEN RECEPTOR POSITIVE (HCC): Status: ACTIVE | Noted: 2022-09-15

## 2022-09-28 LAB
CYTO UR: NORMAL
LAB AP CASE REPORT: NORMAL
LAB AP CLINICAL INFORMATION: NORMAL
LAB AP DIAGNOSIS COMMENT: NORMAL
LAB AP GENOMIC HEALTH, ADDENDUM: NORMAL
PATH REPORT.FINAL DX SPEC: NORMAL
PATH REPORT.GROSS SPEC: NORMAL

## 2022-10-04 NOTE — PROGRESS NOTES
"      PROBLEM LIST:  1. [id number]+ (100%) DC+ (100%) Her2 negative (0+) invasive ductal carcinoma of the right breast  A) right breast lumpectomy on 9/9/22.  Pathology showed a 1.4 cm intermediate grade IDC.  0/1 SLN involved.  oncotype score 16.  2. BRCA2 mutation    Subjective     CHIEF COMPLAINT: breast cancer    HISTORY OF PRESENT ILLNESS:   Carol Nayak returns for follow-up.   She presents to review her oncotype results.  She says that she had been feeling nervous and was wondering if she should have done a bilateral mastectomy.      Objective      /72   Pulse 76   Temp 97.6 °F (36.4 °C)   Resp 18   Ht 160 cm (63\")   Wt 80.3 kg (177 lb)   LMP  (LMP Unknown)   SpO2 98%   BMI 31.35 kg/m²      Performance Status:  ECOG score: 0             General: well appearing female in no acute distress  Neuro: alert and oriented  HEENT: sclera anicteric, oropharynx clear  Extremeties: no lower extremity edema  Skin: no rashes, lesions, bruising, or petechiae  Psych: mood and affect appropriate    I have reexamined the patient and the results are consistent with the previously documented exam. Amanda Solano MD        RECENT LABS:  Lab Results   Component Value Date    WBC 7.13 08/31/2022    HGB 14.1 08/31/2022    HCT 41.8 08/31/2022    MCV 85.3 08/31/2022     08/31/2022       Lab Results   Component Value Date    GLUCOSE 73 08/31/2022    BUN 16 08/31/2022    CREATININE 0.90 08/31/2022    BCR 17.8 08/31/2022    K 4.9 08/31/2022    CO2 28.9 08/31/2022    CALCIUM 9.2 08/31/2022    ALBUMIN 3.90 08/31/2022    AST 21 08/31/2022    ALT 22 08/31/2022                 ASSESSMENT AND PLAN:     Carol Nayak is a 66 y.o. female  with a stage IA ER+ Her2 negative IDC of the right breast.    She has an oncotype score that falls in the low risk range.  Based on this I would not recommend IV chemotherapy.  I do recommend adjuvant endocrine therapy with an aromatase inhibitor.  We reviewed the potential side " effects of anastrozole including hot flashes, vaginal dryness, joint pain, decrease in bone density, and mood or sleep disturbance.  I will order a baseline bone density scan.    She can proceed with radiotherapy, and we will plan to start anastrozole following that.    BRCA2 mutation:  She is a candidate for annual breast MRI in addition to mammogram.  Endocrine therapy can help to reduce the risk of subsequent breast cancer.  Scheduled in high risk clinic in early November.    Follow-up in 3 months.  If she is doing well at that time she can then be seen every 6 months.    Total time of patient care on day of service including time prior to, face to face with patient, and following visit spent in reviewing records, lab results, discussion with patient, and documentation/charting was > 40 minutes.          Amanda Solano MD  [facility]    10/5/2022          CC:

## 2022-10-05 ENCOUNTER — OFFICE VISIT (OUTPATIENT)
Dept: ONCOLOGY | Facility: CLINIC | Age: 67
End: 2022-10-05

## 2022-10-05 VITALS
RESPIRATION RATE: 18 BRPM | BODY MASS INDEX: 31.36 KG/M2 | SYSTOLIC BLOOD PRESSURE: 117 MMHG | DIASTOLIC BLOOD PRESSURE: 72 MMHG | WEIGHT: 177 LBS | HEIGHT: 63 IN | TEMPERATURE: 97.6 F | OXYGEN SATURATION: 98 % | HEART RATE: 76 BPM

## 2022-10-05 DIAGNOSIS — C50.411 MALIGNANT NEOPLASM OF UPPER-OUTER QUADRANT OF RIGHT BREAST IN FEMALE, ESTROGEN RECEPTOR POSITIVE: Primary | ICD-10-CM

## 2022-10-05 DIAGNOSIS — Z17.0 MALIGNANT NEOPLASM OF UPPER-OUTER QUADRANT OF RIGHT BREAST IN FEMALE, ESTROGEN RECEPTOR POSITIVE: Primary | ICD-10-CM

## 2022-10-05 DIAGNOSIS — M81.0 AGE-RELATED OSTEOPOROSIS WITHOUT CURRENT PATHOLOGICAL FRACTURE: ICD-10-CM

## 2022-10-05 PROCEDURE — 99215 OFFICE O/P EST HI 40 MIN: CPT | Performed by: INTERNAL MEDICINE

## 2022-10-05 RX ORDER — ANASTROZOLE 1 MG/1
1 TABLET ORAL DAILY
Qty: 30 TABLET | Refills: 11 | Status: SHIPPED | OUTPATIENT
Start: 2022-10-05 | End: 2022-11-02

## 2022-10-19 ENCOUNTER — HOSPITAL ENCOUNTER (OUTPATIENT)
Dept: RADIATION ONCOLOGY | Facility: HOSPITAL | Age: 67
Setting detail: RADIATION/ONCOLOGY SERIES
Discharge: HOME | End: 2022-10-19

## 2022-10-19 ENCOUNTER — APPOINTMENT (OUTPATIENT)
Dept: RADIATION ONCOLOGY | Facility: HOSPITAL | Age: 67
End: 2022-10-19

## 2022-10-24 ENCOUNTER — OFFICE VISIT (OUTPATIENT)
Dept: RADIATION ONCOLOGY | Facility: HOSPITAL | Age: 67
End: 2022-10-24

## 2022-10-24 ENCOUNTER — HOSPITAL ENCOUNTER (OUTPATIENT)
Dept: RADIATION ONCOLOGY | Facility: HOSPITAL | Age: 67
Discharge: HOME | End: 2022-10-24

## 2022-10-24 VITALS
OXYGEN SATURATION: 98 % | RESPIRATION RATE: 16 BRPM | TEMPERATURE: 98.6 F | SYSTOLIC BLOOD PRESSURE: 116 MMHG | HEART RATE: 74 BPM | WEIGHT: 179.6 LBS | HEIGHT: 63 IN | DIASTOLIC BLOOD PRESSURE: 69 MMHG | BODY MASS INDEX: 31.82 KG/M2

## 2022-10-24 DIAGNOSIS — Z17.0 MALIGNANT NEOPLASM OF UPPER-OUTER QUADRANT OF RIGHT BREAST IN FEMALE, ESTROGEN RECEPTOR POSITIVE: Primary | ICD-10-CM

## 2022-10-24 DIAGNOSIS — C50.411 MALIGNANT NEOPLASM OF UPPER-OUTER QUADRANT OF RIGHT BREAST IN FEMALE, ESTROGEN RECEPTOR POSITIVE: Primary | ICD-10-CM

## 2022-10-24 PROCEDURE — 77290 THER RAD SIMULAJ FIELD CPLX: CPT | Performed by: RADIOLOGY

## 2022-10-24 PROCEDURE — 77333 RADIATION TREATMENT AID(S): CPT | Performed by: RADIOLOGY

## 2022-10-24 NOTE — PROGRESS NOTES
"RE-EVALUATION    PATIENT:                                                      Carol Nayak  :                                                          1955  DATE:                          10/24/2022   DIAGNOSIS:     Malignant neoplasm of upper-outer quadrant of right breast in female, estrogen receptor positive (HCC)  - Stage IA (pT1c, pN0, cM0, G2, ER+, WV+, HER2-)         BRIEF HISTORY:   Carol Nayak  is a very pleasant 66 y.o. female from [place], [place] who on imaging was found to have a 1.2 cm irregular mass at the 11 o'clock position of the right breast 3 cm from the nipple.  There were normal appearing axillary lymph nodes on the right.  She underwent right breast lumpectomy for intermediate to high grade invasive ductal adenocarcinoma measuring 1.4 x 1.3 x 1 cm.  0/1 lymph node was positive for tumor.  Initially the inferior margin was 1 mm that extended posterior lateral margin was negative.  Tumor was ER/WV positive HER2/cesario negative.  She is BRCA2 mutation positive but refused mastectomy. Ms. Sparks is here to proceed with postoperative radiation.    No Known Allergies    Review of Systems   All other systems reviewed and are negative.          Objective   VITAL SIGNS:   Vitals:    10/24/22 1354   BP: 116/69   Pulse: 74   Resp: 16   Temp: 98.6 °F (37 °C)   TempSrc: Temporal   SpO2: 98%  Comment: RA   Weight: 81.5 kg (179 lb 9.6 oz)   Height: 160 cm (63\")   PainSc: 0-No pain        Karnofsky score: 90   KSP %:  90    Physical Exam  Vitals reviewed.   Constitutional:       Appearance: Normal appearance.   Cardiovascular:      Rate and Rhythm: Normal rate and regular rhythm.      Heart sounds: Normal heart sounds.   Pulmonary:      Effort: Pulmonary effort is normal.      Breath sounds: Normal breath sounds.   Neurological:      Mental Status: She is alert.     The breast exam reveals no masses or suspicious lesions in either breast.  The right breast has a well-healing surgical incision " from the 9 to 12 o'clock position adjacent to the nipple complex.  There is no associated hematoma/seroma in the upper quadrant.  She has no axillary adenopathy bilaterally.         The following portions of the patient's history were reviewed and updated as appropriate: allergies, current medications, past family history, past medical history, past social history, past surgical history and problem list.    Diagnoses and all orders for this visit:    Malignant neoplasm of upper-outer quadrant of right breast in female, estrogen receptor positive (HCC)      IMPRESSION:   Carol Nayak underwent right breast lumpectomy for intermediate to high grade invasive ductal adenocarcinoma measuring 1.4 x 1.3 x 1 cm.  0/1 lymph node was positive for tumor.  Initially the inferior margin was 1 mm but extended posterior lateral margin was negative.  Tumor was ER/OR positive HER2/cesario negative.  Ms. Sparks is here to proceed with postoperative radiation.        RECOMMENDATIONS:  I discussed the pros and cons, risks and benefits of postoperative radiation with the patient and informed consent was obtained.  I recommend 40.05 David in 15 fractions and a tumor bed boost of 10 Gray in 5 fractions for this intermediate to high-grade tumor.  She is BRCA to positive but does not want to undergo mastectomy. She saw Dr. Solano and underwent Oncotype testing that fell in the low risk category.  Dr. Solano does recommend endocrine therapy following radiation.    We will proceed with treatment planning today.     Maritza Grady MD

## 2022-10-25 ENCOUNTER — DOCUMENTATION (OUTPATIENT)
Dept: RADIATION ONCOLOGY | Facility: HOSPITAL | Age: 67
End: 2022-10-25

## 2022-10-25 DIAGNOSIS — Z17.0 MALIGNANT NEOPLASM OF UPPER-OUTER QUADRANT OF RIGHT BREAST IN FEMALE, ESTROGEN RECEPTOR POSITIVE: ICD-10-CM

## 2022-10-25 DIAGNOSIS — C50.411 MALIGNANT NEOPLASM OF UPPER-OUTER QUADRANT OF RIGHT BREAST IN FEMALE, ESTROGEN RECEPTOR POSITIVE: ICD-10-CM

## 2022-10-25 PROCEDURE — G0463 HOSPITAL OUTPT CLINIC VISIT: HCPCS

## 2022-11-01 ENCOUNTER — HOSPITAL ENCOUNTER (OUTPATIENT)
Dept: RADIATION ONCOLOGY | Facility: HOSPITAL | Age: 67
Setting detail: RADIATION/ONCOLOGY SERIES
Discharge: HOME | End: 2022-11-01

## 2022-11-01 PROCEDURE — 77334 RADIATION TREATMENT AID(S): CPT | Performed by: RADIOLOGY

## 2022-11-01 PROCEDURE — 77295 3-D RADIOTHERAPY PLAN: CPT | Performed by: RADIOLOGY

## 2022-11-01 PROCEDURE — 77300 RADIATION THERAPY DOSE PLAN: CPT | Performed by: RADIOLOGY

## 2022-11-02 ENCOUNTER — OFFICE VISIT (OUTPATIENT)
Dept: GYNECOLOGIC ONCOLOGY | Facility: CLINIC | Age: 67
End: 2022-11-02

## 2022-11-02 ENCOUNTER — LAB (OUTPATIENT)
Dept: LAB | Facility: HOSPITAL | Age: 67
End: 2022-11-02

## 2022-11-02 VITALS
DIASTOLIC BLOOD PRESSURE: 57 MMHG | HEIGHT: 64 IN | WEIGHT: 177.3 LBS | OXYGEN SATURATION: 96 % | BODY MASS INDEX: 30.27 KG/M2 | SYSTOLIC BLOOD PRESSURE: 114 MMHG | HEART RATE: 75 BPM | RESPIRATION RATE: 15 BRPM | TEMPERATURE: 97.3 F

## 2022-11-02 DIAGNOSIS — Z85.44 PERSONAL HISTORY OF MALIGNANT NEOPLASM OF OTHER FEMALE GENITAL ORGANS: ICD-10-CM

## 2022-11-02 DIAGNOSIS — Z17.0 MALIGNANT NEOPLASM OF UPPER-OUTER QUADRANT OF RIGHT BREAST IN FEMALE, ESTROGEN RECEPTOR POSITIVE: ICD-10-CM

## 2022-11-02 DIAGNOSIS — C50.411 MALIGNANT NEOPLASM OF UPPER-OUTER QUADRANT OF RIGHT BREAST IN FEMALE, ESTROGEN RECEPTOR POSITIVE: ICD-10-CM

## 2022-11-02 DIAGNOSIS — Z15.09 BRCA2 POSITIVE: Primary | ICD-10-CM

## 2022-11-02 DIAGNOSIS — Z91.89 HIGH RISK OF OVARIAN CANCER: ICD-10-CM

## 2022-11-02 DIAGNOSIS — Z15.01 BRCA2 POSITIVE: Primary | ICD-10-CM

## 2022-11-02 PROCEDURE — 99214 OFFICE O/P EST MOD 30 MIN: CPT | Performed by: NURSE PRACTITIONER

## 2022-11-02 PROCEDURE — 86304 IMMUNOASSAY TUMOR CA 125: CPT | Performed by: NURSE PRACTITIONER

## 2022-11-02 PROCEDURE — 36415 COLL VENOUS BLD VENIPUNCTURE: CPT | Performed by: NURSE PRACTITIONER

## 2022-11-02 NOTE — PROGRESS NOTES
CANCER RISK MANAGEMENT CLINIC    Carol Nayak  [id number]  1955    Subjective   Chief Complaint: Establish Care (BRCA2+)      HISTORY OF PRESENT ILLNESS:       Carol Nayak is a 66 y.o. year old female here today for her initial high risk visit, referred here by her oncologist, Dr. Amanda Solano. Patient was recently diagnosed with Stage IA ER/WY+, Her2 negative high grade invasive ductal adenocarcinoma of the right breast. She was referred to genetic counseling and genetic testing was positive for a pathogenic mutation in the BRCA2 gene. Patient refused mastectomy and is now s/p right breast lumpectomy on 2022. She is planning post-op radiation under the care of Dr. Grady, completed simulation yesterday. Then, the plan is to begin hormone blockade with Arimidex after radiation is completed. Patient is interested in pursuing high risk management in addition to her breast cancer care.     Carol is a  with now 1 living child. One child passed away very young and another passed away by suicide as an adult. She underwent menarche at age 13 and had her first baby at age 20. She retains her uterus and ovaries. She does not have a regular gynecologist, last annual exam was about 10 years ago per her report. She denies history of abnormal pap smears. She reports having a pelvic ultrasound about 3-4 years ago that was negative. Her hormonal medication history includes OCPs for about 10 years in the remote past and HRT x 2 years in her late 40's. She is not currently taking any hormonal medications. Patient states she is very fearful of having any surgeries that are not absolutely necessary despite her BRCA2+ status. She reports her sister passed away suddenly in the patient's home r/t to a blood clot post knee surgery within the last year. This was very traumatic and patient is factoring this into her surgery decision making. She plans to undergo mastectomy in the event of breast cancer recurrence and  "may consider gynecologic surgery in the future, but is not interested at this time.     The current medication list and allergy list were reviewed and reconciled.     Past Medical History:   Diagnosis Date   • Brain cancer (HCC)    • Breast cancer (HCC)    • Depression      Past Surgical History:   Procedure Laterality Date   • APPENDECTOMY     • BREAST EXCISIONAL BIOPSY Right     UNABLE TO VISUALIZE SCAR   • CERVICAL DISCECTOMY ANTERIOR     • FOOT SURGERY Right    • MANDIBLE FRACTURE SURGERY Left    • PATELLA FRACTURE SURGERY Right    • SHOULDER SURGERY Left    • THORACIC DISC SURGERY       Family History   Problem Relation Age of Onset   • Colon cancer Mother    • Colon polyps Mother    • Stroke Father    • Heart disease Father    • Breast cancer Sister 40   • Dementia Sister    • Breast cancer Sister 69   • Other Sister         blood clot   • Stroke Sister    • Heart disease Brother    • Ovarian cancer Maternal Grandmother 90   • Ovarian cancer Paternal Grandmother 64         Health Maintenance:    Regular self breast exam: yes  Mammogram: 6/23/2022. History of abnormal mammogram: yes - subsequent diagnosis with Stage IA right breast cancer  Breast MRI: 8/15/2022. Results: no evidence of multifocal or contralateral disease  Ovarian Cancer screen: NEVER    Risk Asessment: Genetic Testing: BRCA2+      Review of Systems   Constitutional: Negative.    Gastrointestinal: Negative.    Genitourinary: Negative.    Psychiatric/Behavioral: The patient is nervous/anxious.    Breast: s/p right lumpectomy, prepared for radiation simulation      Objective   Physical Exam  Vital Signs: /57   Pulse 75   Temp 97.3 °F (36.3 °C)   Resp 15   Ht 161.3 cm (63.5\")   Wt 80.4 kg (177 lb 4.8 oz)   LMP  (LMP Unknown)   SpO2 96%   BMI 30.91 kg/m²   Vitals:    11/02/22 1125   PainSc: 0-No pain           General Appearance:  alert, cooperative, no apparent distress and appears stated age   Neurologic/Psychiatric: A&O x 3, gait " steady, appropriate affect   Breasts:  deferred, radiation simulation yesterday   Abdomen:   Soft, non-tender, non-distended and no organomegaly   Lymph nodes: No cervical, supraclavicular, inguinal adenopathy noted   Skin: No rashes, ulcers, or suspicious lesions noted   Pelvic: External Genitalia  without lesions or skin changes  Vagina  is pale, atrophic.   Cervix  normal, without lesions, no discharge, no CMT and multiparous  Uterus  normal size, midposition, mobile and nontender  Ovaries  without palpable masses or fullness  Parametria  smooth  Rectovaginal  Female rectovaginal: confirms no masses or bleeding and Hemoccult negative       Result Review :   The following data was reviewed by: ARACELI Huddleston on 11/02/2022:  Data reviewed: Radiologic studies breast imaging 6/2022-present and Consultant notes medical oncology, radiation oncology, genetic counseling       Procedure Note:              Assessment and Plan:    Diagnoses and all orders for this visit:    1. BRCA2 positive (Primary)  -       -     US Non-ob Transvaginal; Future    2. Malignant neoplasm of upper-outer quadrant of right breast in female, estrogen receptor positive (HCC)    3. High risk of ovarian cancer  -       -     US Non-ob Transvaginal; Future    4. Personal history of malignant neoplasm of other female genital organs  -             Patient Education:  Reviewed screening schedule related to diagnosis as follows:  Current NCCN guidelines for management of BRCA+ women:  Monthly breast exams starting at age 18.  Clinical breast exam every 6 months.  Annual mammogram starting at age 25, or earlier based upon family history.  Annual breast MRI starting at age 25.  Transvaginal ultrasound and CA-125 every 6 months starting at age 30, or 5-10 years before the earliest diagnosis in the family.   Consider risk-reducing salpingo-oophorectomy between ages 35 and 40 for BRCA1, between ages 40-45 for BRCA2.  Consider  chemoprevention for breast cancer risk reduction (tamoxifen) and ovarian cancer (oral contraceptives).  Consider risk-reducing mastectomy.    Discussed the purpose of high risk clinic in coordinating, scheduling, and planning screening interventions to include yearly mammograms, yearly screening breast MRI, Q6 month CBE, and monthly SBE. Colonoscopy and GYN screening per national guidelines.   Reviewed technique for SBE including concerning findings to report.   Discussed twice yearly CBE. Clinical exam deferred today as patient preparing to undergo right breast radiation and has had multiple recent clinical exams. I will defer to treating oncology team for now, and perform CBE here in 6 months. Patient v/u.   Discussed yearly screening mammogram. Results will be reported by breast imaging center and a copy of report will be sent to our office for review. Mammography plans will be determined by treating oncology team for now, we will follow their recommendations.   Discussed screening breast MRI scheduling based on menstrual cycles, HRT. We also discussed the increased sensitivity of MRI screening and possibility of call- backs for additional imaging or biopsies to establish baseline. Again, will follow oncology recommendations during cancer treatment and surveillance phases.   Discussed every 6 month TVUS and CA-125 for ovarian cancer screening. Explained that this is not comparable to mammograms for detecting breast cancer, but is the best option at present. Baseline CA-125 and TVUS ordered today. She will be notified of results upon their return. Next ultrasound will be here in our office in 6 months. If abnormal findings on imaging or elevated tumor marker, will recommend diagnostic imaging with CT. She v/u.   Discussed vitamin D as benefit to bone health and possible benefit to breast health. Recommended 1000 IU daily unless contraindicated  Discussed benefit of Vitamin E for fibrocystic breasts/breast  pain  Discussed chemoprevention options. Patient will be starting Arimidex following breast radiation for hormone blockade for ER/AL+ breast cancer maintenance. Agree with plan from a preventative standpoint.   Discussed prophylactic surgeries including bilateral mastectomies and bilateral salpingo-oophrectomy. We discussed potential for risk reduction as well as risks of surgery. The patient declines surgical intervention at this time,  but the options were presented to her as education and she knows she can pursue surgical intervention if she desires in the future. She notes she will consider this further in the future or if problems arise in screening imaging.      Pain assessment was performed today as a part of patient’s care. For patients with pain related to surgery, gynecologic malignancy or cancer treatment, the plan is as noted in the assessment/plan.  For patients with pain not related to these issues, they are to seek any further needed care from a more appropriate provider, such as PCP.      I spent 50 minutes caring for Carol on this date of service. This time includes time spent by me in the following activities: preparing for the visit, reviewing tests, obtaining and/or reviewing a separately obtained history, performing a medically appropriate examination and/or evaluation, counseling and educating the patient/family/caregiver, ordering medications, tests, or procedures, documenting information in the medical record and care coordination.           Return to clinic in 6 months for High Risk visit.      Electronically signed by ARACELI Huddleston on 11/07/22 at 14:05 EST

## 2022-11-03 LAB — CANCER AG125 SERPL QL: 7.2 U/ML (ref 0–38.1)

## 2022-11-07 ENCOUNTER — HOSPITAL ENCOUNTER (OUTPATIENT)
Dept: RADIATION ONCOLOGY | Facility: HOSPITAL | Age: 67
Discharge: HOME | End: 2022-11-07

## 2022-11-07 PROCEDURE — 77280 THER RAD SIMULAJ FIELD SMPL: CPT | Performed by: RADIOLOGY

## 2022-11-08 ENCOUNTER — HOSPITAL ENCOUNTER (OUTPATIENT)
Dept: RADIATION ONCOLOGY | Facility: HOSPITAL | Age: 67
Discharge: HOME | End: 2022-11-08

## 2022-11-08 VITALS — BODY MASS INDEX: 31.12 KG/M2 | WEIGHT: 178.5 LBS

## 2022-11-08 PROCEDURE — 77412 RADIATION TX DELIVERY LVL 3: CPT | Performed by: RADIOLOGY

## 2022-11-09 ENCOUNTER — HOSPITAL ENCOUNTER (OUTPATIENT)
Dept: RADIATION ONCOLOGY | Facility: HOSPITAL | Age: 67
Discharge: HOME | End: 2022-11-09

## 2022-11-09 PROCEDURE — 77412 RADIATION TX DELIVERY LVL 3: CPT | Performed by: RADIOLOGY

## 2022-11-10 ENCOUNTER — HOSPITAL ENCOUNTER (OUTPATIENT)
Dept: RADIATION ONCOLOGY | Facility: HOSPITAL | Age: 67
Discharge: HOME | End: 2022-11-10

## 2022-11-10 PROCEDURE — 77412 RADIATION TX DELIVERY LVL 3: CPT | Performed by: RADIOLOGY

## 2022-11-11 ENCOUNTER — HOSPITAL ENCOUNTER (OUTPATIENT)
Dept: RADIATION ONCOLOGY | Facility: HOSPITAL | Age: 67
Discharge: HOME | End: 2022-11-11

## 2022-11-11 PROCEDURE — 77336 RADIATION PHYSICS CONSULT: CPT | Performed by: RADIOLOGY

## 2022-11-11 PROCEDURE — 77412 RADIATION TX DELIVERY LVL 3: CPT | Performed by: RADIOLOGY

## 2022-11-14 ENCOUNTER — HOSPITAL ENCOUNTER (OUTPATIENT)
Dept: RADIATION ONCOLOGY | Facility: HOSPITAL | Age: 67
Discharge: HOME | End: 2022-11-14

## 2022-11-14 PROCEDURE — 77412 RADIATION TX DELIVERY LVL 3: CPT | Performed by: RADIOLOGY

## 2022-11-14 PROCEDURE — 77417 THER RADIOLOGY PORT IMAGE(S): CPT | Performed by: RADIOLOGY

## 2022-11-15 ENCOUNTER — HOSPITAL ENCOUNTER (OUTPATIENT)
Dept: RADIATION ONCOLOGY | Facility: HOSPITAL | Age: 67
Discharge: HOME | End: 2022-11-15

## 2022-11-15 VITALS — WEIGHT: 177.8 LBS | BODY MASS INDEX: 31 KG/M2

## 2022-11-15 PROCEDURE — 77412 RADIATION TX DELIVERY LVL 3: CPT | Performed by: RADIOLOGY

## 2022-11-15 PROCEDURE — 77417 THER RADIOLOGY PORT IMAGE(S): CPT | Performed by: RADIOLOGY

## 2022-11-16 ENCOUNTER — HOSPITAL ENCOUNTER (OUTPATIENT)
Dept: RADIATION ONCOLOGY | Facility: HOSPITAL | Age: 67
Discharge: HOME | End: 2022-11-16

## 2022-11-16 PROCEDURE — 77412 RADIATION TX DELIVERY LVL 3: CPT | Performed by: RADIOLOGY

## 2022-11-17 ENCOUNTER — HOSPITAL ENCOUNTER (OUTPATIENT)
Dept: RADIATION ONCOLOGY | Facility: HOSPITAL | Age: 67
Discharge: HOME | End: 2022-11-17

## 2022-11-17 PROCEDURE — 77336 RADIATION PHYSICS CONSULT: CPT | Performed by: RADIOLOGY

## 2022-11-17 PROCEDURE — 77412 RADIATION TX DELIVERY LVL 3: CPT | Performed by: RADIOLOGY

## 2022-11-18 ENCOUNTER — HOSPITAL ENCOUNTER (OUTPATIENT)
Dept: RADIATION ONCOLOGY | Facility: HOSPITAL | Age: 67
Discharge: HOME | End: 2022-11-18

## 2022-11-18 PROCEDURE — 77412 RADIATION TX DELIVERY LVL 3: CPT | Performed by: RADIOLOGY

## 2022-11-20 ENCOUNTER — HOSPITAL ENCOUNTER (OUTPATIENT)
Dept: RADIATION ONCOLOGY | Facility: HOSPITAL | Age: 67
Discharge: HOME | End: 2022-11-20

## 2022-11-20 PROCEDURE — 77412 RADIATION TX DELIVERY LVL 3: CPT | Performed by: RADIOLOGY

## 2022-11-21 ENCOUNTER — HOSPITAL ENCOUNTER (OUTPATIENT)
Dept: RADIATION ONCOLOGY | Facility: HOSPITAL | Age: 67
Discharge: HOME | End: 2022-11-21

## 2022-11-21 PROCEDURE — 77412 RADIATION TX DELIVERY LVL 3: CPT | Performed by: RADIOLOGY

## 2022-11-21 PROCEDURE — 77417 THER RADIOLOGY PORT IMAGE(S): CPT | Performed by: RADIOLOGY

## 2022-11-22 ENCOUNTER — HOSPITAL ENCOUNTER (OUTPATIENT)
Dept: RADIATION ONCOLOGY | Facility: HOSPITAL | Age: 67
Discharge: HOME | End: 2022-11-22

## 2022-11-22 VITALS — BODY MASS INDEX: 31.12 KG/M2 | WEIGHT: 178.5 LBS

## 2022-11-22 PROCEDURE — 77412 RADIATION TX DELIVERY LVL 3: CPT | Performed by: RADIOLOGY

## 2022-11-23 ENCOUNTER — HOSPITAL ENCOUNTER (OUTPATIENT)
Dept: RADIATION ONCOLOGY | Facility: HOSPITAL | Age: 67
Discharge: HOME | End: 2022-11-23

## 2022-11-23 PROCEDURE — 77412 RADIATION TX DELIVERY LVL 3: CPT | Performed by: RADIOLOGY

## 2022-11-23 PROCEDURE — 77336 RADIATION PHYSICS CONSULT: CPT | Performed by: RADIOLOGY

## 2022-11-28 ENCOUNTER — HOSPITAL ENCOUNTER (OUTPATIENT)
Dept: RADIATION ONCOLOGY | Facility: HOSPITAL | Age: 67
Discharge: HOME | End: 2022-11-28

## 2022-11-28 PROCEDURE — 77412 RADIATION TX DELIVERY LVL 3: CPT | Performed by: RADIOLOGY

## 2022-11-28 PROCEDURE — 77280 THER RAD SIMULAJ FIELD SMPL: CPT | Performed by: RADIOLOGY

## 2022-11-29 ENCOUNTER — HOSPITAL ENCOUNTER (OUTPATIENT)
Dept: RADIATION ONCOLOGY | Facility: HOSPITAL | Age: 67
Discharge: HOME | End: 2022-11-29

## 2022-11-29 VITALS — BODY MASS INDEX: 31.37 KG/M2 | WEIGHT: 179.9 LBS

## 2022-11-29 PROCEDURE — 77412 RADIATION TX DELIVERY LVL 3: CPT | Performed by: RADIOLOGY

## 2022-11-30 ENCOUNTER — HOSPITAL ENCOUNTER (OUTPATIENT)
Dept: RADIATION ONCOLOGY | Facility: HOSPITAL | Age: 67
Discharge: HOME | End: 2022-11-30

## 2022-11-30 PROCEDURE — 77412 RADIATION TX DELIVERY LVL 3: CPT | Performed by: RADIOLOGY

## 2022-12-01 ENCOUNTER — HOSPITAL ENCOUNTER (OUTPATIENT)
Dept: RADIATION ONCOLOGY | Facility: HOSPITAL | Age: 67
Discharge: HOME | End: 2022-12-01

## 2022-12-01 ENCOUNTER — HOSPITAL ENCOUNTER (OUTPATIENT)
Dept: RADIATION ONCOLOGY | Facility: HOSPITAL | Age: 67
Setting detail: RADIATION/ONCOLOGY SERIES
Discharge: HOME | End: 2022-12-01
Payer: COMMERCIAL

## 2022-12-01 PROCEDURE — 77336 RADIATION PHYSICS CONSULT: CPT | Performed by: RADIOLOGY

## 2022-12-01 PROCEDURE — 77412 RADIATION TX DELIVERY LVL 3: CPT | Performed by: RADIOLOGY

## 2022-12-02 ENCOUNTER — HOSPITAL ENCOUNTER (OUTPATIENT)
Dept: RADIATION ONCOLOGY | Facility: HOSPITAL | Age: 67
Discharge: HOME | End: 2022-12-02

## 2022-12-02 PROCEDURE — 77412 RADIATION TX DELIVERY LVL 3: CPT | Performed by: RADIOLOGY

## 2022-12-05 ENCOUNTER — HOSPITAL ENCOUNTER (OUTPATIENT)
Dept: RADIATION ONCOLOGY | Facility: HOSPITAL | Age: 67
Discharge: HOME | End: 2022-12-05

## 2022-12-05 PROCEDURE — 77412 RADIATION TX DELIVERY LVL 3: CPT | Performed by: RADIOLOGY

## 2022-12-06 ENCOUNTER — APPOINTMENT (OUTPATIENT)
Dept: BONE DENSITY | Facility: HOSPITAL | Age: 67
End: 2022-12-06

## 2022-12-06 ENCOUNTER — HOSPITAL ENCOUNTER (OUTPATIENT)
Dept: RADIATION ONCOLOGY | Facility: HOSPITAL | Age: 67
Discharge: HOME | End: 2022-12-06

## 2022-12-06 VITALS — BODY MASS INDEX: 31.4 KG/M2 | WEIGHT: 180.1 LBS

## 2022-12-06 PROCEDURE — 77412 RADIATION TX DELIVERY LVL 3: CPT | Performed by: RADIOLOGY

## 2022-12-06 NOTE — RADIATION COMPLETION NOTES
COMPLETION NOTE    PATIENT:   Carol Nayak  :    1955  COMPLETION DATE:   22  DIAGNOSIS:      Malignant neoplasm of upper-outer quadrant of right breast in female, estrogen receptor positive (HCC)  - Stage IA (pT1c, pN0, cM0, G2, ER+, MD+, HER2-)           Subjective      BRIEF HISTORY:   Carol Nayak  is a very pleasant 66 y.o. female from [place], [place] who on imaging was found to have a 1.2 cm irregular mass at the 11 o'clock position of the right breast 3 cm from the nipple.  There were normal appearing axillary lymph nodes on the right.  She underwent right breast lumpectomy for intermediate to high grade invasive ductal adenocarcinoma measuring 1.4 x 1.3 x 1 cm.  0/1 lymph node was positive for tumor.  Initially the inferior margin was 1 mm that extended posterior lateral margin was negative.  Tumor was ER/MD positive HER2/cesario negative.  She is BRCA2 mutation positive but refused mastectomy.  She received radiotherapy in our department as follows:    TREATMENT COURSE:   -2022 the right breast received 40.05 Gy in 15 fractions with 6 MV photons  -2022 the tumor bed was boosted with 10 Gy in 5 fractions with 10 MV photons    TOLERANCE:   Ms. Nayak had no difficulty tolerating treatment.  The skin became mildly erythematous. She had mild pruritus.  We recommended CeraVe and hydrocortisone cream.  She had a sore throat unrelated to treatment.  She had no odynophagia.      DISPOSITION:  At the completion of therapy an appointment was made for her to return on 2023 at 10:30 AM.  She knows to call if she has any problems sooner.        Maritza Grady MD    Dictated using dragon dictation

## 2022-12-29 ENCOUNTER — APPOINTMENT (OUTPATIENT)
Dept: BONE DENSITY | Facility: HOSPITAL | Age: 67
End: 2022-12-29

## 2023-01-04 ENCOUNTER — OFFICE VISIT (OUTPATIENT)
Dept: ONCOLOGY | Facility: CLINIC | Age: 68
End: 2023-01-04
Payer: MEDICARE

## 2023-01-04 VITALS
DIASTOLIC BLOOD PRESSURE: 69 MMHG | OXYGEN SATURATION: 96 % | WEIGHT: 184 LBS | RESPIRATION RATE: 18 BRPM | TEMPERATURE: 97.4 F | SYSTOLIC BLOOD PRESSURE: 128 MMHG | HEIGHT: 63 IN | HEART RATE: 86 BPM | BODY MASS INDEX: 32.6 KG/M2

## 2023-01-04 DIAGNOSIS — C50.411 MALIGNANT NEOPLASM OF UPPER-OUTER QUADRANT OF RIGHT BREAST IN FEMALE, ESTROGEN RECEPTOR POSITIVE: Primary | ICD-10-CM

## 2023-01-04 DIAGNOSIS — Z17.0 MALIGNANT NEOPLASM OF UPPER-OUTER QUADRANT OF RIGHT BREAST IN FEMALE, ESTROGEN RECEPTOR POSITIVE: Primary | ICD-10-CM

## 2023-01-04 PROCEDURE — 1160F RVW MEDS BY RX/DR IN RCRD: CPT | Performed by: NURSE PRACTITIONER

## 2023-01-04 PROCEDURE — 1159F MED LIST DOCD IN RCRD: CPT | Performed by: NURSE PRACTITIONER

## 2023-01-04 PROCEDURE — 99213 OFFICE O/P EST LOW 20 MIN: CPT | Performed by: NURSE PRACTITIONER

## 2023-01-04 PROCEDURE — 1126F AMNT PAIN NOTED NONE PRSNT: CPT | Performed by: NURSE PRACTITIONER

## 2023-01-04 RX ORDER — OMEPRAZOLE 40 MG/1
40 CAPSULE, DELAYED RELEASE ORAL DAILY
COMMUNITY
Start: 2022-12-08

## 2023-01-04 RX ORDER — ANASTROZOLE 1 MG/1
1 TABLET ORAL DAILY
Qty: 30 TABLET | Refills: 11 | Status: SHIPPED | OUTPATIENT
Start: 2023-01-04

## 2023-01-04 NOTE — PROGRESS NOTES
PROBLEM LIST:  1. [id number]+ (100%) AK+ (100%) Her2 negative (0+) invasive ductal carcinoma of the right breast  A) right breast lumpectomy on 9/9/22.  Pathology showed a 1.4 cm intermediate grade IDC.  0/1 SLN involved.  oncotype score 16.  B) Completed radiation 12/6/2022. Started anastrozole 1/4/2023  2. BRCA2 mutation    Subjective     CHIEF COMPLAINT: breast cancer    HISTORY OF PRESENT ILLNESS:   Carol Nayak returns for follow-up.   She completed adjuvant radiotherapy to the right breast 12/6/2022.  She tolerated radiation well.  She has not started anastrozole yet.  She does have occasional hot flashes and night sweats that she has had for years.  She denies any concerns today.          Objective      /69   Pulse 86   Temp 97.4 °F (36.3 °C)   Resp 18   Ht 160 cm (63\")   Wt 83.5 kg (184 lb)   LMP  (LMP Unknown)   SpO2 96%   BMI 32.59 kg/m²    Vitals:    01/04/23 0956   PainSc: 0-No pain             ECOG score: 0             General: well appearing female in no acute distress  Neuro: alert and oriented  HEENT: sclera anicteric, oropharynx clear  Extremeties: no lower extremity edema  Skin: no rashes, lesions, bruising, or petechiae  Psych: mood and affect appropriate    I have reexamined the patient and the results are consistent with the previously documented exam. Eve Nelson, APRN        RECENT LABS:  Lab Results   Component Value Date    WBC 7.13 08/31/2022    HGB 14.1 08/31/2022    HCT 41.8 08/31/2022    MCV 85.3 08/31/2022     08/31/2022       Lab Results   Component Value Date    GLUCOSE 73 08/31/2022    BUN 16 08/31/2022    CREATININE 0.90 08/31/2022    BCR 17.8 08/31/2022    K 4.9 08/31/2022    CO2 28.9 08/31/2022    CALCIUM 9.2 08/31/2022    ALBUMIN 3.90 08/31/2022    AST 21 08/31/2022    ALT 22 08/31/2022                 ASSESSMENT AND PLAN:     Carol Nayak is a 67 y.o. female  with a stage IA ER+ Her2 negative IDC of the right breast.    She had a low risk  Oncotype and did not require chemotherapy.  She completed adjuvant radiotherapy to the right breast 12/6/2022.  She did not start anastrozole.  A new prescription for anastrozole sent to pharmacy today.  We reviewed the potential side effects of anastrozole including hot flashes, vaginal dryness, joint pain, decrease in bone density, and mood or sleep disturbance.    Bone density scan: She had to cancel appointment. She will reschedule soon.     She will be due for bilateral diagnostic mammogram June 2023. Ordered today. She will be due for annual breast MRI in August. Ordered today.     BRCA2 mutation:  She is a candidate for annual breast MRI in addition to mammogram.  Endocrine therapy can help to reduce the risk of subsequent breast cancer. Continue to be followed in high risk clinic.     Follow up in 2 months.   If she is doing well at that time she can then be seen every 6 months.            Eve Nelson APRN  [facility]ology [facility] On[facility]    1/4/2023          CC:

## 2023-01-12 ENCOUNTER — TELEPHONE (OUTPATIENT)
Dept: ONCOLOGY | Facility: CLINIC | Age: 68
End: 2023-01-12
Payer: MEDICARE

## 2023-01-12 NOTE — TELEPHONE ENCOUNTER
Spoke with patient, I got her rescheduled for her Bone Density on 3/7/23 arriving at 9:20am for a 9:40am scan.

## 2023-01-12 NOTE — TELEPHONE ENCOUNTER
Caller: Carol Nayak    Relationship: Self    Best call back number: [phone]    What is the best time to reach you: ASAP    Who are you requesting to speak with (clinical staff, provider,  specific staff member): /NURSE    What was the call regarding: DR YATES PUT ORDER IN FOR BONE DENSITY 10/5/22 BUT PT SAYS THEY ARE TELLING HER THEY WON'T BE ABLE TO GET HER IN FOR THAT UNTIL July.  PT IS ASKING IF DR YATES CAN SEND THE ORDER TO A DIFFERENT LOCATION THAT CAN GET HER IN SOONER    Do you require a callback: YES

## 2023-01-23 ENCOUNTER — HOSPITAL ENCOUNTER (OUTPATIENT)
Dept: RADIATION ONCOLOGY | Facility: HOSPITAL | Age: 68
Setting detail: RADIATION/ONCOLOGY SERIES
Discharge: HOME | End: 2023-01-23
Payer: MEDICARE

## 2023-01-23 ENCOUNTER — OFFICE VISIT (OUTPATIENT)
Dept: RADIATION ONCOLOGY | Facility: HOSPITAL | Age: 68
End: 2023-01-23
Payer: MEDICARE

## 2023-01-23 VITALS
WEIGHT: 188 LBS | DIASTOLIC BLOOD PRESSURE: 75 MMHG | SYSTOLIC BLOOD PRESSURE: 115 MMHG | BODY MASS INDEX: 33.31 KG/M2 | RESPIRATION RATE: 16 BRPM | HEIGHT: 63 IN | HEART RATE: 82 BPM | TEMPERATURE: 97.5 F | OXYGEN SATURATION: 95 %

## 2023-01-23 DIAGNOSIS — Z17.0 MALIGNANT NEOPLASM OF UPPER-OUTER QUADRANT OF RIGHT BREAST IN FEMALE, ESTROGEN RECEPTOR POSITIVE: Primary | ICD-10-CM

## 2023-01-23 DIAGNOSIS — C50.411 MALIGNANT NEOPLASM OF UPPER-OUTER QUADRANT OF RIGHT BREAST IN FEMALE, ESTROGEN RECEPTOR POSITIVE: Primary | ICD-10-CM

## 2023-01-23 PROCEDURE — G0463 HOSPITAL OUTPT CLINIC VISIT: HCPCS

## 2023-01-23 NOTE — PROGRESS NOTES
FOLLOW UP NOTE    PATIENT:                                                      Carol Nayak  MEDICAL RECORD #:                        [id number]  :                                                          1955  COMPLETION DATE:   2022  DIAGNOSIS:     Malignant neoplasm of upper-outer quadrant of right breast in female, estrogen receptor positive (HCC)  -Stage IA (pT1c, pN0, cM0, G2, ER+, KS+, HER2-)      BRIEF HISTORY:   Carol Nayak is a 67 y.o. female with recent diagnosis of a right-sided breast cancer.  Genetics testing revealed a pathogenic mutation in the BRCA2 gene.  The patient declined bilateral mastectomy.  She underwent right breast lumpectomy 2022 with Dr. JUAREZ.  Pathology revealed a 1.4 cm intermediate grade invasive ductal carcinoma.  Tumor was ER/KS positive HER2/cesario negative.  Distance from closest surgical margin was approximately 1 mm.  0/1 lymph node was positive for tumor.  Oncotype recurrence score was 16 and adjuvant chemotherapy was not recommended.  She underwent adjuvant radiation therapy to the right breast consisting of 40.05 Gy in 15 fractions, followed by a tumor bed boost of an additional 10 Gy in 5 fractions.  She tolerated treatment well.  Posttreatment fatigue was mild and brief.  She did develop dry desquamation and pruritis involving the right breast and inframammary fold.  This was managed with topical Aquaphor and hydrocortisone cream.  She notes occasional tenderness of the right breast.  She denies lymphedema or decreased range of motion of the right arm.  She denies dyspnea or chest pain.  She began adjuvant anastrozole 2 weeks ago and denies associated side effects.  She denies additional concerns today.        MEDICATIONS: Medication reconciliation for the patient was reviewed and confirmed in the electronic medical record.    Review of Systems   All other systems reviewed and are negative.          KPS 90%      Physical Exam  Vitals and nursing note  "reviewed.   Constitutional:       General: She is not in acute distress.     Appearance: Normal appearance. She is well-developed.   HENT:      Head: Normocephalic and atraumatic.   Eyes:      Conjunctiva/sclera: Conjunctivae normal.      Pupils: Pupils are equal, round, and reactive to light.   Cardiovascular:      Rate and Rhythm: Normal rate and regular rhythm.   Pulmonary:      Effort: Pulmonary effort is normal.      Breath sounds: Normal breath sounds.   Chest:      Comments: The breast exam reveals slight asymmetry, R<L.  Surgical incision of the right breast appears well-healed.  Skin appears nonerythematous and intact.  Residual patchy hyperpigmentation of the right breast medially is consistent with postradiation effect.  There are no suspicious nodules or masses involving the right breast.  No evidence of lymphedema or right axillary adenopathy.  The left breast was not examined.  Musculoskeletal:         General: Normal range of motion.      Cervical back: Normal range of motion and neck supple.   Lymphadenopathy:      Cervical: No cervical adenopathy.   Skin:     General: Skin is warm and dry.   Neurological:      Mental Status: She is alert and oriented to person, place, and time.   Psychiatric:         Behavior: Behavior normal.         Thought Content: Thought content normal.         Judgment: Judgment normal.         VITAL SIGNS:   Vitals:    01/23/23 1041   BP: 115/75   Pulse: 82   Resp: 16   Temp: 97.5 °F (36.4 °C)   SpO2: 95%  Comment: RA   Weight: 85.3 kg (188 lb)   Height: 160 cm (63\")   PainSc: 0-No pain           The following portions of the patient's history were reviewed and updated as appropriate: allergies, current medications, past family history, past medical history, past social history, past surgical history and problem list.         Diagnoses and all orders for this visit:    1. Malignant neoplasm of upper-outer quadrant of right breast in female, estrogen receptor positive (HCC) " (Primary)         IMPRESSION:  Carol Nayak is a 67 y.o. female with recent diagnosis of a stage IA (pT1c, pN0, cM0) ER/AZ positive, HER2/cesario negative intermediate grade invasive ductal carcinoma of the right breast.  Genetics testing revealed BRCA2 positivity.  The patient declined mastectomy.  She underwent right breast lumpectomy, followed by adjuvant radiation therapy which she completed 6 weeks ago to complete her breast conservation therapy.  She tolerated treatment well.  She developed the anticipated grade 1 fatigue and grade 1 radiation dermatitis, which have appropriately subsided at this time.  Clinical breast exam today is without concerning findings.  The patient and I reviewed the role of stretching and range of motion exercises of the right upper extremity as well as local massage to the right breast to reduce the risk of treatment related fibrosis and lymphedema.  We reviewed the role of self breast examinations.  She has since begun adjuvant endocrine therapy with an aromatase inhibitor and is tolerating this well.  We discussed typical timeframe for adjuvant anastrozole is a minimum of 5 years.  We reviewed follow-up intervals, including biannual mammograms to alternate between the right and bilateral breasts.  She is scheduled for repeat diagnostic bilateral mammogram in July 2023.  She will also benefit from annual breast MRI which will be due August 2023.  The patient was uninterested in prophylactic bilateral salpingo-oophorectomy but continues to be followed in the high risk clinic.    RECOMMENDATIONS:   Kristen Nayak will continue routine oncologic surveillance under the care of of Dr. Solano, with follow-up in the breast cancer survivorship clinic on 3/7/2023.  Additionally, she continues to be seen in the high risk clinic as well as with Dr. JUAREZ in the surgical oncology breast clinic.  Given the close follow-up with her other providers, we will be happy to remain available as  needed.        Return if symptoms worsen or fail to improve, for Office Visit.    ARACELI Beltran    I spent a total of 30 minutes on today's visit, with more than 15 minutes in direct face to face communication, and the remainder of the time spent in reviewing the relevant history, records, available imaging, and for documentation.

## 2023-03-02 ENCOUNTER — OFFICE VISIT (OUTPATIENT)
Dept: ONCOLOGY | Facility: CLINIC | Age: 68
End: 2023-03-02
Payer: MEDICARE

## 2023-03-02 VITALS
BODY MASS INDEX: 32.78 KG/M2 | RESPIRATION RATE: 18 BRPM | SYSTOLIC BLOOD PRESSURE: 118 MMHG | HEART RATE: 78 BPM | WEIGHT: 185 LBS | HEIGHT: 63 IN | DIASTOLIC BLOOD PRESSURE: 71 MMHG | OXYGEN SATURATION: 97 % | TEMPERATURE: 97.2 F

## 2023-03-02 DIAGNOSIS — Z17.0 MALIGNANT NEOPLASM OF UPPER-OUTER QUADRANT OF RIGHT BREAST IN FEMALE, ESTROGEN RECEPTOR POSITIVE: Primary | ICD-10-CM

## 2023-03-02 DIAGNOSIS — C50.411 MALIGNANT NEOPLASM OF UPPER-OUTER QUADRANT OF RIGHT BREAST IN FEMALE, ESTROGEN RECEPTOR POSITIVE: Primary | ICD-10-CM

## 2023-03-02 PROCEDURE — 99213 OFFICE O/P EST LOW 20 MIN: CPT | Performed by: NURSE PRACTITIONER

## 2023-03-02 RX ORDER — VENLAFAXINE HYDROCHLORIDE 150 MG/1
1 CAPSULE, EXTENDED RELEASE ORAL DAILY
COMMUNITY
Start: 2022-12-08

## 2023-03-02 NOTE — PROGRESS NOTES
"      PROBLEM LIST:  1. [id number]+ (100%) TN+ (100%) Her2 negative (0+) invasive ductal carcinoma of the right breast  A) right breast lumpectomy on 9/9/22.  Pathology showed a 1.4 cm intermediate grade IDC.  0/1 SLN involved.  oncotype score 16.  B) Completed radiation 12/6/2022. Started anastrozole 1/4/2023  2. BRCA2 mutation    Subjective     CHIEF COMPLAINT: breast cancer    HISTORY OF PRESENT ILLNESS:   Carol Nayak returns for follow-up.   She started anastrozole January 4, 2023.  She is tolerating the anastrozole well.  She has occasional hot flashes that are manageable.  She denies any arthralgias.  She has been on Effexor for quite some time for depression.  She can tell no change in her mood.  She is maintaining her usual daily activities.      Objective      /71   Pulse 78   Temp 97.2 °F (36.2 °C)   Resp 18   Ht 160 cm (63\")   Wt 83.9 kg (185 lb)   LMP  (LMP Unknown)   SpO2 97%   BMI 32.77 kg/m²    Vitals:    03/02/23 1020   PainSc: 0-No pain             ECOG score: 0             General: well appearing female in no acute distress  Neuro: alert and oriented  HEENT: sclera anicteric, oropharynx clear  Extremeties: no lower extremity edema  Skin: no rashes, lesions, bruising, or petechiae  Psych: mood and affect appropriate    I have reexamined the patient and the results are consistent with the previously documented exam. Eve Nelson, APRN        RECENT LABS:  Lab Results   Component Value Date    WBC 7.13 08/31/2022    HGB 14.1 08/31/2022    HCT 41.8 08/31/2022    MCV 85.3 08/31/2022     08/31/2022       Lab Results   Component Value Date    GLUCOSE 73 08/31/2022    BUN 16 08/31/2022    CREATININE 0.90 08/31/2022    BCR 17.8 08/31/2022    K 4.9 08/31/2022    CO2 28.9 08/31/2022    CALCIUM 9.2 08/31/2022    ALBUMIN 3.90 08/31/2022    AST 21 08/31/2022    ALT 22 08/31/2022                 ASSESSMENT AND PLAN:     Carol Nayak is a 67 y.o. female  with a stage IA ER+ Her2 " negative IDC of the right breast.    She had a low risk Oncotype and did not require chemotherapy.  She completed adjuvant radiotherapy to the right breast 12/6/2022.  She started anastrozole January 4, 2023.  She is tolerating it relatively well.  We will plan on anastrozole for minimum of 5 years.    Bone density scan scheduled for 3/7/2023.    Diagnostic mammogram scheduled for 7/28/2023.  She will be due for an annual breast MRI in August.  This was previously ordered but has not been scheduled.    BRCA2 mutation:  She is a candidate for annual breast MRI in addition to mammogram.  Endocrine therapy can help to reduce the risk of subsequent breast cancer. Continue to be followed in high risk clinic.     Follow up in 6 months.            Eve Nelson, APRMIRA  [facility] Hematology and Oncology    3/2/2023          CC:

## 2023-03-07 ENCOUNTER — HOSPITAL ENCOUNTER (OUTPATIENT)
Dept: BONE DENSITY | Facility: HOSPITAL | Age: 68
Discharge: HOME | End: 2023-03-07
Admitting: INTERNAL MEDICINE
Payer: MEDICARE

## 2023-03-07 DIAGNOSIS — C50.411 MALIGNANT NEOPLASM OF UPPER-OUTER QUADRANT OF RIGHT BREAST IN FEMALE, ESTROGEN RECEPTOR POSITIVE: ICD-10-CM

## 2023-03-07 DIAGNOSIS — M81.0 AGE-RELATED OSTEOPOROSIS WITHOUT CURRENT PATHOLOGICAL FRACTURE: ICD-10-CM

## 2023-03-07 DIAGNOSIS — Z17.0 MALIGNANT NEOPLASM OF UPPER-OUTER QUADRANT OF RIGHT BREAST IN FEMALE, ESTROGEN RECEPTOR POSITIVE: ICD-10-CM

## 2023-03-07 PROCEDURE — 77080 DXA BONE DENSITY AXIAL: CPT

## 2023-04-03 DIAGNOSIS — C50.411 MALIGNANT NEOPLASM OF UPPER-OUTER QUADRANT OF RIGHT BREAST IN FEMALE, ESTROGEN RECEPTOR POSITIVE: Primary | ICD-10-CM

## 2023-04-03 DIAGNOSIS — Z17.0 MALIGNANT NEOPLASM OF UPPER-OUTER QUADRANT OF RIGHT BREAST IN FEMALE, ESTROGEN RECEPTOR POSITIVE: Primary | ICD-10-CM

## 2023-05-02 ENCOUNTER — TELEPHONE (OUTPATIENT)
Dept: ONCOLOGY | Facility: CLINIC | Age: 68
End: 2023-05-02
Payer: MEDICARE

## 2023-05-02 ENCOUNTER — LAB (OUTPATIENT)
Dept: LAB | Facility: HOSPITAL | Age: 68
End: 2023-05-02
Payer: MEDICARE

## 2023-05-02 ENCOUNTER — OFFICE VISIT (OUTPATIENT)
Dept: GYNECOLOGIC ONCOLOGY | Facility: CLINIC | Age: 68
End: 2023-05-02
Payer: MEDICARE

## 2023-05-02 VITALS
WEIGHT: 189 LBS | OXYGEN SATURATION: 97 % | SYSTOLIC BLOOD PRESSURE: 135 MMHG | DIASTOLIC BLOOD PRESSURE: 66 MMHG | HEIGHT: 63 IN | HEART RATE: 86 BPM | TEMPERATURE: 97.6 F | BODY MASS INDEX: 33.49 KG/M2 | RESPIRATION RATE: 18 BRPM

## 2023-05-02 DIAGNOSIS — R97.8 OTHER ABNORMAL TUMOR MARKERS: ICD-10-CM

## 2023-05-02 DIAGNOSIS — Z85.3 HISTORY OF BREAST CANCER: ICD-10-CM

## 2023-05-02 DIAGNOSIS — Z15.01 BRCA POSITIVE: Primary | ICD-10-CM

## 2023-05-02 DIAGNOSIS — R52 GENERALIZED PAIN: ICD-10-CM

## 2023-05-02 DIAGNOSIS — Z15.09 BRCA POSITIVE: Primary | ICD-10-CM

## 2023-05-02 DIAGNOSIS — Z91.89 HIGH RISK OF OVARIAN CANCER: ICD-10-CM

## 2023-05-02 LAB — CANCER AG125 SERPL QL: 6.7 U/ML (ref 0–38.1)

## 2023-05-02 PROCEDURE — 86304 IMMUNOASSAY TUMOR CA 125: CPT | Performed by: NURSE PRACTITIONER

## 2023-05-02 PROCEDURE — 36415 COLL VENOUS BLD VENIPUNCTURE: CPT | Performed by: NURSE PRACTITIONER

## 2023-05-02 NOTE — TELEPHONE ENCOUNTER
Called patient to discuss increasing arthralgias over the last few months.  She states the pain is very bothersome.  I have instructed her to hold the anastrozole for 3 weeks to see if the pain resolves.  If the pain does resolve after holding the anastrozole we will switch her to exemestane.  I have asked her to contact us in 3 weeks to let us know how she is doing off of the medication.

## 2023-05-02 NOTE — PROGRESS NOTES
"CANCER RISK MANAGEMENT CLINIC     Carol Nayak  [id number]  1955    Subjective   Chief Complaint: Follow-up (High risk BRCA2+)      HISTORY OF PRESENT ILLNESS:       Carol Nayak is a 67 y.o. year old female here today for her high risk visit. She has a history of BRCA2+ right breast cancer. She is followed by Dr. Amanda Solano and ARACELI Maier of our Medical Oncology team here. She refused mastectomy and is s/p right breast lumpectomy on 9/9/2022. She completed radiation under the care of Dr. Grady of our Radiation Oncology team in 12/2022 and is now on Arimidex (started 1/4/2023) for maintenance hormone blockade.     At initial high risk visit patient reported fear of surgery due to her sister having passed away from a blood clot following knee surgery in 2022. She states she plans to undergo mastectomy in the event of a breat cancer recurrence only. She is not interested in gynecologic surgery at this time. Patient completed baseline CA-125 following our last visit = 7.2. Order was given for transvaginal ultrasound, but it appears this has not been completed. Last pelvic ultrasound about 4 years ago per patient report. She is postmenopausal and does not use HRT. She retains uterus and ovaries. Patient declines TVUS in office today, would like to complete later via the hospital. She denies any new or concerning breast changes on home self exams. Breast imaging is currently up-to-date, ordered by medical oncology team.     Upon arrival today patient c/o generalized pain, worsening over the last month. She states onset of pain was gradual, started with pain in her heels for which she changed shoes but had no relief. She states pain then started in her low back and is now in \"every bone\". Tylenol has not helped her pain. She denies any trauma, injuries, or history of chronic pain. She inquires if her medications may be contributing to her symptoms.     The current medication list and allergy list " "were reviewed and reconciled.     Past Medical History, Past Surgical History, Social History, Family History have been reviewed and are without significant changes except as mentioned.      Health Maintenance:    Regular self breast exam: yes  Mammogram: 6/2022. History of abnormal mammogram: yes - subsequent diagnosis with right breast cancer  Breast MRI: 8/15/2022. Results: positive BiRADS 6, known malignancy  Ovarian Cancer screen: CA-125 11/2/2022 = 7.2, TVUS ordered in 11/2022 not completed    Risk Asessment: Genetic Testing: BRCA2+      Review of Systems   Constitutional: Negative.    Gastrointestinal: Negative.    Genitourinary: Negative.    Breast: Negative      Objective   Physical Exam  Vital Signs: /66   Pulse 86   Temp 97.6 °F (36.4 °C) (Temporal)   Resp 18   Ht 160 cm (63\")   Wt 85.7 kg (189 lb)   LMP  (LMP Unknown)   SpO2 97%   BMI 33.48 kg/m²   Vitals:    05/02/23 0957   PainSc:   6   PainLoc: Generalized           General Appearance:  alert, cooperative, no apparent distress, appears stated age and obese by BMI criteria   Neurologic/Psychiatric: A&O x 3, gait steady, appropriate affect   Breasts:  no masses, no lesions, no nipple discharge and right breast scarring consistent with h/o lumpectomy and radiation   Abdomen:   Soft, non-tender, non-distended and no organomegaly   Lymph nodes: No axillary adenopathy noted   Skin: No rashes, ulcers, or suspicious lesions noted   Pelvic: declined by patient         Procedure Note:              Assessment and Plan:    Diagnoses and all orders for this visit:    1. BRCA positive (Primary)  -       -     US Non-ob Transvaginal; Future    2. History of breast cancer    3. High risk of ovarian cancer  -       -     US Non-ob Transvaginal; Future    4. Other abnormal tumor markers  -         5. Generalized pain          Patient Education:  Reviewed screening schedule related to diagnosis as follows:  Current NCCN guidelines for " management of BRCA+ women:  Monthly breast exams starting at age 18.  Clinical breast exam every 6 months.  Annual mammogram starting at age 25, or earlier based upon family history.  Annual breast MRI starting at age 25.  Transvaginal ultrasound and CA-125 every 6 months starting at age 30, or 5-10 years before the earliest diagnosis in the family.   Consider risk-reducing salpingo-oophorectomy between ages 35 and 40 for BRCA1, between ages 40-45 for BRCA2.  Consider chemoprevention for breast cancer risk reduction (tamoxifen) and ovarian cancer (oral contraceptives).  Consider risk-reducing mastectomy.      Discussed the purpose of high risk clinic in coordinating, scheduling, and planning screening interventions to include yearly mammograms, yearly screening breast MRI, Q6 month CBE, and monthly SBE. Colonoscopy and GYN screening per national guidelines.   Reviewed technique for SBE including concerning findings to report.   Discussed twice yearly CBE. Clinical exam unremarkable today, repeat in 6 months.   Discussed yearly screening mammogram. Results will be reported by breast imaging center and a copy of report will be sent to our office for review. Mammography plans will be determined by treating oncology team for now, we will follow their recommendations. Next imaging studies already ordered.   Discussed screening breast MRI scheduling based on menstrual cycles, HRT. We also discussed the increased sensitivity of MRI screening and possibility of call- backs for additional imaging or biopsies to establish baseline. Again, will follow oncology recommendations during cancer treatment and surveillance phases. Next MRI already ordered.   Discussed every 6 month TVUS and CA-125 for ovarian cancer screening. CA-125 and TVUS ordered today. Initial TVUS for screening has not yet been completed. TVUS offered in office today, patient would like to do later via hospital due to her current generalized pain. New order  placed. She is agreeable to have blood work done today. She will be notified of results upon their return. If abnormal findings on imaging or elevated tumor marker, will recommend diagnostic imaging with CT. She v/u.   Discussed vitamin D as benefit to bone health and possible benefit to breast health. Recommended 1000 IU daily unless contraindicated  Discussed benefit of Vitamin E for fibrocystic breasts/breast pain  Discussed chemoprevention options. Patient currently on Arimidex for hormone blockade for ER/SD+ breast cancer maintenance. Agree with plan from a preventative standpoint.   Discussed prophylactic surgeries including bilateral mastectomies and bilateral salpingo-oophrectomy. We discussed potential for risk reduction as well as risks of surgery. The patient declines surgical intervention at this time,  but the options were presented to her as education and she knows she can pursue surgical intervention if she desires in the future. She notes she will consider this further in the future or if problems arise in screening imaging.      Patient and I discussed her c/o generalized pain, this had slow onset and is gradually worsening over time. She denies any known injury or h/o chronic pain conditions. Med list reviewed and explained that arthralgias is a known side effect of Arimidex. I will reach out to Med Onc on her behalf to see if she needs to see them for follow-up of medication. Recommended ibuprofen PRN.     Pain assessment was performed today as a part of patient’s care. For patients with pain related to surgery, gynecologic malignancy or cancer treatment, the plan is as noted in the assessment/plan.  For patients with pain not related to these issues, they are to seek any further needed care from a more appropriate provider, such as PCP.      I spent 40 minutes caring for Carol on this date of service. This time includes time spent by me in the following activities: preparing for the visit,  reviewing tests, performing a medically appropriate examination and/or evaluation, counseling and educating the patient/family/caregiver, ordering medications, tests, or procedures, referring and communicating with other health care professionals and documenting information in the medical record.           Return to clinic in 6 months for High Risk visit.      Electronically signed by ARACELI Huddleston on 05/02/23 at 13:09 EDT

## 2023-05-03 ENCOUNTER — TELEPHONE (OUTPATIENT)
Dept: GYNECOLOGIC ONCOLOGY | Facility: CLINIC | Age: 68
End: 2023-05-03
Payer: MEDICARE

## 2023-05-03 NOTE — TELEPHONE ENCOUNTER
----- Message from ARACELI Huddleston sent at 5/3/2023  8:21 AM EDT -----  Please notify CA-125 low/stable. Thanks!

## 2023-07-28 ENCOUNTER — HOSPITAL ENCOUNTER (OUTPATIENT)
Dept: MAMMOGRAPHY | Facility: HOSPITAL | Age: 68
Discharge: HOME | End: 2023-07-28
Admitting: NURSE PRACTITIONER
Payer: MEDICARE

## 2023-07-28 DIAGNOSIS — C50.411 MALIGNANT NEOPLASM OF UPPER-OUTER QUADRANT OF RIGHT BREAST IN FEMALE, ESTROGEN RECEPTOR POSITIVE: ICD-10-CM

## 2023-07-28 DIAGNOSIS — Z17.0 MALIGNANT NEOPLASM OF UPPER-OUTER QUADRANT OF RIGHT BREAST IN FEMALE, ESTROGEN RECEPTOR POSITIVE: ICD-10-CM

## 2023-07-28 PROCEDURE — G0279 TOMOSYNTHESIS, MAMMO: HCPCS

## 2023-07-28 PROCEDURE — 77066 DX MAMMO INCL CAD BI: CPT

## 2023-09-07 ENCOUNTER — OFFICE VISIT (OUTPATIENT)
Dept: ONCOLOGY | Facility: CLINIC | Age: 68
End: 2023-09-07
Payer: MEDICARE

## 2023-09-07 VITALS
DIASTOLIC BLOOD PRESSURE: 63 MMHG | OXYGEN SATURATION: 96 % | TEMPERATURE: 96.6 F | WEIGHT: 189.9 LBS | HEART RATE: 82 BPM | BODY MASS INDEX: 33.65 KG/M2 | RESPIRATION RATE: 16 BRPM | HEIGHT: 63 IN | SYSTOLIC BLOOD PRESSURE: 109 MMHG

## 2023-09-07 DIAGNOSIS — C50.411 MALIGNANT NEOPLASM OF UPPER-OUTER QUADRANT OF RIGHT BREAST IN FEMALE, ESTROGEN RECEPTOR POSITIVE: Primary | ICD-10-CM

## 2023-09-07 DIAGNOSIS — Z17.0 MALIGNANT NEOPLASM OF UPPER-OUTER QUADRANT OF RIGHT BREAST IN FEMALE, ESTROGEN RECEPTOR POSITIVE: Primary | ICD-10-CM

## 2023-09-07 PROCEDURE — 1126F AMNT PAIN NOTED NONE PRSNT: CPT | Performed by: INTERNAL MEDICINE

## 2023-09-07 PROCEDURE — 99214 OFFICE O/P EST MOD 30 MIN: CPT | Performed by: INTERNAL MEDICINE

## 2023-09-07 RX ORDER — EXEMESTANE 25 MG/1
25 TABLET ORAL DAILY
Qty: 30 TABLET | Refills: 11 | Status: SHIPPED | OUTPATIENT
Start: 2023-09-07

## 2023-09-07 NOTE — PROGRESS NOTES
"      PROBLEM LIST:  1. [id number]+ (100%) ID+ (100%) Her2 negative (0+) invasive ductal carcinoma of the right breast  A) right breast lumpectomy on 9/9/22.  Pathology showed a 1.4 cm intermediate grade IDC.  0/1 SLN involved.  oncotype score 16.  B) Completed radiation 12/6/2022. Started anastrozole 1/4/2023  2. BRCA2 mutation    Subjective     CHIEF COMPLAINT: breast cancer    HISTORY OF PRESENT ILLNESS:   Carol Nayak returns for follow-up.   She started anastrozole January 4, 2023.  She started having severe arthralgias and stopped taking anastrozole in May.  She says her symptoms all resolved within about 5 days.    Her older sister is on hospice and she has been spending a lot of time helping to care for her at this summer.    Objective      /63   Pulse 82   Temp 96.6 °F (35.9 °C) (Infrared)   Resp 16   Ht 160 cm (62.99\")   Wt 86.1 kg (189 lb 14.4 oz)   LMP  (LMP Unknown)   SpO2 96%   BMI 33.65 kg/m²    Vitals:    09/07/23 1023   PainSc: 0-No pain             ECOG score: 0             General: well appearing female in no acute distress  Neuro: alert and oriented  HEENT: sclera anicteric, oropharynx clear  Extremeties: no lower extremity edema  Skin: no rashes, lesions, bruising, or petechiae  Psych: mood and affect appropriate    I have reexamined the patient and the results are consistent with the previously documented exam. Amanda Solano MD        RECENT LABS:  Lab Results   Component Value Date    WBC 7.13 08/31/2022    HGB 14.1 08/31/2022    HCT 41.8 08/31/2022    MCV 85.3 08/31/2022     08/31/2022       Lab Results   Component Value Date    GLUCOSE 73 08/31/2022    BUN 16 08/31/2022    CREATININE 0.90 08/31/2022    BCR 17.8 08/31/2022    K 4.9 08/31/2022    CO2 28.9 08/31/2022    CALCIUM 9.2 08/31/2022    ALBUMIN 3.90 08/31/2022    AST 21 08/31/2022    ALT 22 08/31/2022                 ASSESSMENT AND PLAN:     Carol Nayak is a 67 y.o. female  with a stage IA ER+ Her2 " negative IDC of the right breast.    She had a low risk Oncotype and did not require chemotherapy.  She completed adjuvant radiotherapy to the right breast 12/6/2022.  She started anastrozole January 4, 2023 and then stopped it after about 4 months due to arthralgias.  She is willing to try different medication.  We will try exemestane.  We discussed that this also has a risk of arthralgias but hopefully will be better tolerated for her.    Bone density scan arched 2023 showed osteopenia.  We will plan to repeat this in 2 years.    Recent mammogram was category 3.  6-month follow-up is scheduled in early 2024.    BRCA2 mutation:  She is a candidate for annual breast MRI in addition to mammogram.  Breast MRI has been ordered and she has been contacted to schedule this but it has not yet been scheduled.   Continue to be followed in high risk clinic.     Follow up in 3 months.  If she is doing well then we will see her every 6 months.            Amanda Solano MD  [facility] Hematology and Oncology    9/7/2023          CC:

## 2023-10-17 ENCOUNTER — HOSPITAL ENCOUNTER (OUTPATIENT)
Dept: MRI IMAGING | Facility: HOSPITAL | Age: 68
Discharge: HOME | End: 2023-10-17
Admitting: SURGERY
Payer: COMMERCIAL

## 2023-10-17 DIAGNOSIS — C50.411 CARCINOMA OF UPPER-OUTER QUADRANT OF FEMALE BREAST, RIGHT: ICD-10-CM

## 2023-10-17 LAB — CREAT BLDA-MCNC: 0.9 MG/DL (ref 0.6–1.3)

## 2023-10-17 PROCEDURE — 0 GADOBENATE DIMEGLUMINE 529 MG/ML SOLUTION: Performed by: SURGERY

## 2023-10-17 PROCEDURE — A9577 INJ MULTIHANCE: HCPCS | Performed by: SURGERY

## 2023-10-17 PROCEDURE — 82565 ASSAY OF CREATININE: CPT

## 2023-10-17 PROCEDURE — 77049 MRI BREAST C-+ W/CAD BI: CPT

## 2023-10-17 RX ADMIN — GADOBENATE DIMEGLUMINE 17 ML: 529 INJECTION, SOLUTION INTRAVENOUS at 12:03

## 2023-10-26 ENCOUNTER — TELEPHONE (OUTPATIENT)
Dept: GYNECOLOGIC ONCOLOGY | Facility: CLINIC | Age: 68
End: 2023-10-26
Payer: MEDICARE

## 2023-10-26 NOTE — TELEPHONE ENCOUNTER
Caller: Carol Nayak    Relationship to patient: Self    Best call back number: [phone]    Chief complaint: RESCHEDULE     Type of visit: FOLLOW UP     Requested date: NA     If rescheduling, when is the original appointment: 11-1     Additional notes:PLEASE ADVISE

## 2023-11-08 ENCOUNTER — TELEPHONE (OUTPATIENT)
Dept: ONCOLOGY | Facility: CLINIC | Age: 68
End: 2023-11-08
Payer: MEDICARE

## 2023-11-08 ENCOUNTER — LAB (OUTPATIENT)
Dept: LAB | Facility: HOSPITAL | Age: 68
End: 2023-11-08
Payer: MEDICARE

## 2023-11-08 ENCOUNTER — OFFICE VISIT (OUTPATIENT)
Dept: ONCOLOGY | Facility: CLINIC | Age: 68
End: 2023-11-08
Payer: MEDICARE

## 2023-11-08 VITALS
DIASTOLIC BLOOD PRESSURE: 70 MMHG | OXYGEN SATURATION: 97 % | HEIGHT: 63 IN | WEIGHT: 190 LBS | BODY MASS INDEX: 33.66 KG/M2 | TEMPERATURE: 97.3 F | HEART RATE: 87 BPM | SYSTOLIC BLOOD PRESSURE: 129 MMHG | RESPIRATION RATE: 20 BRPM

## 2023-11-08 DIAGNOSIS — Z15.09 BRCA2 POSITIVE: ICD-10-CM

## 2023-11-08 DIAGNOSIS — R97.8 OTHER ABNORMAL TUMOR MARKERS: ICD-10-CM

## 2023-11-08 DIAGNOSIS — Z15.09 BRCA2 POSITIVE: Primary | ICD-10-CM

## 2023-11-08 DIAGNOSIS — Z85.3 HISTORY OF BREAST CANCER: ICD-10-CM

## 2023-11-08 DIAGNOSIS — Z91.89 HIGH RISK OF OVARIAN CANCER: ICD-10-CM

## 2023-11-08 DIAGNOSIS — Z15.01 BRCA2 POSITIVE: ICD-10-CM

## 2023-11-08 DIAGNOSIS — Z15.01 BRCA2 POSITIVE: Primary | ICD-10-CM

## 2023-11-08 LAB — CANCER AG125 SERPL QL: 7.3 U/ML (ref 0–38.1)

## 2023-11-08 PROCEDURE — 99213 OFFICE O/P EST LOW 20 MIN: CPT | Performed by: NURSE PRACTITIONER

## 2023-11-08 PROCEDURE — 1126F AMNT PAIN NOTED NONE PRSNT: CPT | Performed by: NURSE PRACTITIONER

## 2023-11-08 PROCEDURE — 86304 IMMUNOASSAY TUMOR CA 125: CPT

## 2023-11-08 PROCEDURE — 36415 COLL VENOUS BLD VENIPUNCTURE: CPT

## 2023-11-08 NOTE — PROGRESS NOTES
[facility]     Carol Nayak  [id number]  1955    Subjective   Chief Complaint: Follow-up (High risk BRCA2+)      HISTORY OF PRESENT ILLNESS:       Carol Nayak is a 67 y.o. year old female here today for her high risk visit. She has a history of BRCA2+ right breast cancer. She is followed by Dr. Amanda Solano and ARACELI Maier of our Medical Oncology team here. She refused mastectomy and is s/p right breast lumpectomy on 9/9/2022. She completed radiation under the care of Dr. Grady of our Radiation Oncology team in 12/2022. She used anastrozole in 1/2023, but discontinued after 4 months due to arthralgias. Last oncology follow-up with Dr. Solano 9/7/2023, was prescribed exemestane for hormone blockade. She has some aching in her feet and legs but this is much more tolerable than with previous medication.     Patient has reported fear of surgery due to her sister having passed away from a blood clot following knee surgery in 2022. She states she plans to undergo mastectomy in the event of a breat cancer recurrence only. She is not interested in gynecologic surgery at this time. Last CA-125 = 6.7 on 5/2/2023. Order was given last year for transvaginal ultrasound, but it appears this has still not been completed. Declined TVUS in office here 6 months ago. Last pelvic ultrasound about 4 years ago per patient report. She is postmenopausal and does not use HRT. She retains uterus and ovaries. Denies vaginal bleeding, pelvic pain, concerning lesions, abdominal fullness/bloating, or changes in bowel or bladder function. Colonoscopy completed 2 months ago per patient report, negative.    Patient denies any new or concerning breast changes on home self exams. MRI completed last month negative, BiRADS 2. Last mammogram was bilateral diagnostic on 7/28/2023, BiRADS 3, recommended to repeat right breast in 6 months and has been already ordered, scheduled for 2/2024.          The current  "medication list and allergy list were reviewed and reconciled.     Past Medical History, Past Surgical History, Social History, Family History have been reviewed and are without significant changes except as mentioned.      Health Maintenance:    Regular self breast exam: yes  Mammogram: bilateral diagnostic 7/28/2023. History of abnormal mammogram: yes - subsequent diagnosis with right breast cancer  Breast MRI: 10/17/2023. Results: negative  Ovarian Cancer screen: CA-125 5/2/2023 = 6.7, TVUS not yet completed    Risk Asessment: Genetic Testing: BRCA2+      Review of Systems   Constitutional: Negative.    Gastrointestinal: Negative.    Genitourinary: Negative.    Psychiatric/Behavioral: Negative.           Objective   Physical Exam  Vital Signs: /70   Pulse 87   Temp 97.3 °F (36.3 °C) (Temporal)   Resp 20   Ht 160 cm (63\")   Wt 86.2 kg (190 lb)   LMP  (LMP Unknown)   SpO2 97%   BMI 33.66 kg/m²   Vitals:    11/08/23 0953   PainSc: 0-No pain             General Appearance:  alert, cooperative, no apparent distress, appears stated age and obese by BMI criteria   Neurologic/Psych: A&O x 3, gait steady, appropriate affect   Breasts:  no masses, no lesions, no nipple discharge and right breast scarring consistent with h/o lumpectomy and radiation   Abdomen:   Soft, non-tender, non-distended and no organomegaly   Lymph nodes: No axillary adenopathy noted   Skin: No rashes, ulcers, or suspicious lesions noted   Pelvic: External Genitalia  atrophic, without lesions  Vagina  is pale, atrophic.   Cervix  normal, without lesions, no discharge, and no CMT  Uterus  normal size, midposition, mobile, and nontender  Ovaries  without palpable masses or fullness  Parametria  smooth  Rectovaginal  Female rectovaginal: deferred         Procedure Note:              Assessment and Plan:    Diagnoses and all orders for this visit:    BRCA2 positive  -     ; Future  -     US Non-ob Transvaginal; Future    History of " breast cancer    High risk of ovarian cancer  -     ; Future  -     US Non-ob Transvaginal; Future    Other abnormal tumor markers  -     ; Future            Patient Education:  Current NCCN guidelines for management of BRCA+ women:  Monthly breast exams starting at age 18.  Clinical breast exam every 6 months.  Annual mammogram starting at age 25, or earlier based upon family history.  Annual breast MRI starting at age 25.  Transvaginal ultrasound and CA-125 every 6 months starting at age 30, or 5-10 years before the earliest diagnosis in the family.   Consider risk-reducing salpingo-oophorectomy between ages 35 and 40 for BRCA1, between ages 40-45 for BRCA2.  Consider chemoprevention for breast cancer risk reduction (tamoxifen) and ovarian cancer (oral contraceptives).  Consider risk-reducing mastectomy.      Discussed the purpose of high risk clinic in coordinating, scheduling, and planning screening interventions to include yearly mammograms, yearly screening breast MRI, Q6 month CBE, and monthly SBE. Colonoscopy and GYN screening per national guidelines.   Reviewed technique for SBE including concerning findings to report.   Discussed twice yearly CBE. Clinical exam unremarkable today, repeat in 6 months.   Discussed yearly screening mammogram. Results will be reported by breast imaging center and a copy of report will be sent to our office for review. Mammography plans will be determined by treating oncology team for now, we will follow their recommendations. Next imaging studies already ordered.   Discussed screening breast MRI scheduling based on menstrual cycles, HRT. We also discussed the increased sensitivity of MRI screening and possibility of call- backs for additional imaging or biopsies to establish baseline. Repeat MRI due in 10/2024.  Discussed every 6 month TVUS and CA-125 for ovarian cancer screening. CA-125 and TVUS ordered today. Initial TVUS for screening has not yet been completed.  New order placed, she will be called to schedule. Lab ordered today. She will be notified of results upon their return. If abnormal findings on imaging or elevated tumor marker, will recommend diagnostic imaging with CT. She v/u.   Discussed vitamin D as benefit to bone health and possible benefit to breast health. Recommended 1000 IU daily unless contraindicated  Discussed benefit of Vitamin E for fibrocystic breasts/breast pain  Discussed chemoprevention options. Patient currently on exemestane for hormone blockade for ER/OH+ breast cancer maintenance. Agree with plan from a preventative standpoint.   Discussed prophylactic surgeries including bilateral mastectomies and bilateral salpingo-oophrectomy. We discussed potential for risk reduction as well as risks of surgery. The patient declines surgical intervention at this time,  but the options were presented to her as education and she knows she can pursue surgical intervention if she desires in the future. She notes she will consider this further in the future or if problems arise in screening imaging.      Keep all follow-ups with oncology team.       Pain assessment was performed today as a part of patient’s care. For patients with pain related to surgery, gynecologic malignancy or cancer treatment, the plan is as noted in the assessment/plan.  For patients with pain not related to these issues, they are to seek any further needed care from a more appropriate provider, such as PCP.            Return to clinic in 6 months for High Risk visit.      Electronically signed by ARACELI Huddleston on 11/08/23 at 10:34 EST

## 2023-11-08 NOTE — TELEPHONE ENCOUNTER
----- Message from ARACELI Huddleston sent at 11/8/2023  4:09 PM EST -----  Please notify CA-125 low/stable. Thanks!

## 2024-01-12 ENCOUNTER — TELEPHONE (OUTPATIENT)
Dept: ONCOLOGY | Facility: CLINIC | Age: 69
End: 2024-01-12

## 2024-01-12 ENCOUNTER — OFFICE VISIT (OUTPATIENT)
Dept: ONCOLOGY | Facility: CLINIC | Age: 69
End: 2024-01-12
Payer: MEDICARE

## 2024-01-12 VITALS
RESPIRATION RATE: 18 BRPM | SYSTOLIC BLOOD PRESSURE: 126 MMHG | TEMPERATURE: 97.6 F | HEART RATE: 89 BPM | HEIGHT: 63 IN | DIASTOLIC BLOOD PRESSURE: 86 MMHG | OXYGEN SATURATION: 97 % | WEIGHT: 194 LBS | BODY MASS INDEX: 34.38 KG/M2

## 2024-01-12 DIAGNOSIS — Z17.0 MALIGNANT NEOPLASM OF UPPER-OUTER QUADRANT OF RIGHT BREAST IN FEMALE, ESTROGEN RECEPTOR POSITIVE: Primary | ICD-10-CM

## 2024-01-12 DIAGNOSIS — C50.411 MALIGNANT NEOPLASM OF UPPER-OUTER QUADRANT OF RIGHT BREAST IN FEMALE, ESTROGEN RECEPTOR POSITIVE: Primary | ICD-10-CM

## 2024-01-12 PROCEDURE — 1126F AMNT PAIN NOTED NONE PRSNT: CPT | Performed by: NURSE PRACTITIONER

## 2024-01-12 PROCEDURE — 1159F MED LIST DOCD IN RCRD: CPT | Performed by: NURSE PRACTITIONER

## 2024-01-12 PROCEDURE — 99213 OFFICE O/P EST LOW 20 MIN: CPT | Performed by: NURSE PRACTITIONER

## 2024-01-12 PROCEDURE — 1160F RVW MEDS BY RX/DR IN RCRD: CPT | Performed by: NURSE PRACTITIONER

## 2024-01-12 NOTE — PROGRESS NOTES
"      PROBLEM LIST:  1. [id number]+ (100%) MD+ (100%) Her2 negative (0+) invasive ductal carcinoma of the right breast  A) right breast lumpectomy on 9/9/22.  Pathology showed a 1.4 cm intermediate grade IDC.  0/1 SLN involved.  oncotype score 16.  B) Completed radiation 12/6/2022. Started anastrozole 1/4/2023.  Anastrozole stopped 4/2023 d/t intolerance.  Started exemestane 9/7/2023. Exemestane stopped 11/2023 d/t intolerance.   2. BRCA2 mutation    Subjective     CHIEF COMPLAINT: breast cancer    HISTORY OF PRESENT ILLNESS:   Carol Nayak returns for follow-up.  She started exemestane 9/7/2023.  She stopped the exemestane on Thanksgiving day due to severe leg pain.  Within a week of stopping the exemestane the leg pain had resolved.  Overall she is doing well.  She denies any new medical concerns today.        Objective      /86   Pulse 89   Temp 97.6 °F (36.4 °C) (Temporal)   Resp 18   Ht 160 cm (62.99\")   Wt 88 kg (194 lb)   LMP  (LMP Unknown)   SpO2 97%   BMI 34.37 kg/m²    Vitals:    01/12/24 1100   PainSc: 0-No pain               ECOG score: 0             General: well appearing female in no acute distress  Neuro: alert and oriented  HEENT: sclera anicteric, oropharynx clear  Extremeties: no lower extremity edema  Skin: no rashes, lesions, bruising, or petechiae  Psych: mood and affect appropriate    I have reexamined the patient and the results are consistent with the previously documented exam. Eve Nelson, ARACELI        RECENT LABS:  Lab Results   Component Value Date    WBC 7.13 08/31/2022    HGB 14.1 08/31/2022    HCT 41.8 08/31/2022    MCV 85.3 08/31/2022     08/31/2022       Lab Results   Component Value Date    GLUCOSE 73 08/31/2022    BUN 16 08/31/2022    CREATININE 0.90 10/17/2023    BCR 17.8 08/31/2022    K 4.9 08/31/2022    CO2 28.9 08/31/2022    CALCIUM 9.2 08/31/2022    ALBUMIN 3.90 08/31/2022    AST 21 08/31/2022    ALT 22 08/31/2022                 ASSESSMENT AND " PLAN:     Carol Nayak is a 68 y.o. female  with a stage IA ER+ Her2 negative IDC of the right breast.    She had a low risk Oncotype and did not require chemotherapy.  She completed adjuvant radiotherapy to the right breast 12/6/2022.  She started anastrozole January 4, 2023 and then stopped it after about 4 months due to arthralgias.  She started exemestane September 2023.  She stopped exemestane November 2023 due to intolerance.  We discussed the option of trying letrozole or tamoxifen.  She is not interested in trying the letrozole since it has the potential for similar side effects  related to arthralgias.  Patient reports today that she has a personal history of DVT so I would like to avoid tamoxifen.  We will continue to monitor her clinically with no estrogen blockade at this time.    Mammogram scheduled for February 21, 2024.  She will need her breast MRI scheduled at her next visit which will be due in October 2024.    BRCA2 mutation: Continue mammograms and breast MRI.  She will continue to be followed in the high-risk clinic.    Follow-up in 6 months.                  Eve Nelson, ARACELI  [facility] Hematology and Oncology    1/12/2024          CC:

## 2024-02-21 ENCOUNTER — HOSPITAL ENCOUNTER (OUTPATIENT)
Dept: MAMMOGRAPHY | Facility: HOSPITAL | Age: 69
Discharge: HOME | End: 2024-02-21
Admitting: RADIOLOGY
Payer: MEDICARE

## 2024-02-21 DIAGNOSIS — R92.8 ABNORMAL MAMMOGRAM: ICD-10-CM

## 2024-02-21 PROCEDURE — 77065 DX MAMMO INCL CAD UNI: CPT

## 2024-02-21 PROCEDURE — G0279 TOMOSYNTHESIS, MAMMO: HCPCS

## 2024-02-21 PROCEDURE — G0279 TOMOSYNTHESIS, MAMMO: HCPCS | Performed by: RADIOLOGY

## 2024-02-21 PROCEDURE — 77065 DX MAMMO INCL CAD UNI: CPT | Performed by: RADIOLOGY

## 2024-05-02 ENCOUNTER — OFFICE VISIT (OUTPATIENT)
Dept: GYNECOLOGIC ONCOLOGY | Facility: CLINIC | Age: 69
End: 2024-05-02
Payer: MEDICARE

## 2024-05-02 VITALS
RESPIRATION RATE: 18 BRPM | HEIGHT: 63 IN | HEART RATE: 72 BPM | SYSTOLIC BLOOD PRESSURE: 150 MMHG | BODY MASS INDEX: 34.91 KG/M2 | OXYGEN SATURATION: 97 % | TEMPERATURE: 97.1 F | DIASTOLIC BLOOD PRESSURE: 74 MMHG | WEIGHT: 197 LBS

## 2024-05-02 DIAGNOSIS — Z15.01 BRCA2 POSITIVE: ICD-10-CM

## 2024-05-02 DIAGNOSIS — Z15.09 BRCA2 POSITIVE: ICD-10-CM

## 2024-05-02 DIAGNOSIS — Z85.3 HISTORY OF BREAST CANCER: Primary | ICD-10-CM

## 2024-05-02 DIAGNOSIS — Z91.89 HIGH RISK OF OVARIAN CANCER: ICD-10-CM

## 2024-05-02 NOTE — PROGRESS NOTES
[facility]K MANAGEMENT CLINIC     Carol Nayak  [id number]  1955    Subjective   Chief Complaint: Follow up (high risk BRCA2+)    HISTORY OF PRESENT ILLNESS:    Carol Nayak is a 68 y.o. year old female here today for her high risk visit. She has a history of BRCA2+ right breast cancer. She is followed by Dr. Amanda Solano and ARACELI Maier of our Medical Oncology team here. She refused mastectomy and is s/p right breast lumpectomy on 9/9/2022. She completed radiation under the care of Dr. Grady of our Radiation Oncology team in 12/2022. She used anastrozole in 1/2023, but discontinued after 4 months due to arthralgias. During oncology follow-up with Dr. Solano 9/7/2023, she was prescribed exemestane for hormone blockade. Pt has subsequently d/c'd this as well.    Patient has reported fear of surgery due to her sister having passed away from a blood clot following knee surgery in 2022. She states she plans to undergo mastectomy in the event of a breat cancer recurrence only. She is not interested in gynecologic surgery at this time. Last CA-125 = 7.3 on 11/8/2023. Order was given last year for transvaginal ultrasound, but it appears this has still not been completed. Last pelvic ultrasound about 4 years ago per patient report. She is postmenopausal and does not use HRT. She retains uterus and ovaries. Denies vaginal bleeding, pelvic pain, concerning lesions, abdominal fullness/bloating, or changes in bowel or bladder function. Colonoscopy completed 9/2023 per patient report, negative. Patient denies any new or concerning breast changes on home self exams.   The current medication list and allergy list were reviewed and reconciled.     Past Medical History, Past Surgical History, Social History, Family History have been reviewed and are without significant changes except as mentioned.    Health Maintenance:    Regular self breast exam: yes  Mammogram: bilateral diagnostic 7/28/2023. History of  "abnormal mammogram: yes - subsequent diagnosis with right breast cancer  Breast MRI: 10/17/2023. Results: negative  Ovarian Cancer screen: CA-125 11/8/2023 = 7.3, TVUS not yet completed        Risk Asessment: Genetic Testing: BRCA2+      Review of Systems   Constitutional: Negative.    Gastrointestinal: Negative.    Genitourinary: Negative.    Psychiatric/Behavioral: Negative.         Objective   Physical Exam  Vital Signs: /74   Pulse 72   Temp 97.1 °F (36.2 °C) (Temporal)   Resp 18   Ht 160 cm (62.99\")   Wt 89.4 kg (197 lb)   LMP  (LMP Unknown)   SpO2 97%   BMI 34.91 kg/m²   Vitals:    05/02/24 1025   PainSc: 0-No pain           General Appearance:  alert, cooperative, no apparent distress and appears stated age   Neurologic/Psych: A&O x 3, gait steady, appropriate affect   Breasts:  Symmetrical, no masses, no lesions, no nipple discharge, and right breast scarring consistent with history of lumpectomy and radiation   Abdomen:   Soft, non-tender, non-distended, and no organomegaly   Lymph nodes: No cervical, supraclavicular, inguinal or axillary adenopathy noted   Skin: No rashes, ulcers, or suspicious lesions noted   Pelvic: External Genitalia  atrophic, without lesions  Vagina  is pale, atrophic.   Cervix  normal, without lesions, no discharge, and no CMT  Uterus  normal size, midposition, mobile, and nontender  Ovaries  without palpable masses or fullness  Parametria  smooth  Rectovaginal  Female rectovaginal: deferred       Result Review :        Procedure Note:              Assessment and Plan:      Diagnoses and all orders for this visit:    1. History of breast cancer (Primary)    2. BRCA2 positive    3. High risk of ovarian cancer        Patient Education:  Reviewed screening schedule related to diagnosis as follows:    Current NCCN guidelines for management of BRCA+ women:  Monthly breast exams starting at age 18.  Clinical breast exam every 6 months.  Annual mammogram starting at age 25, or " earlier based upon family history.  Annual breast MRI starting at age 25.  Transvaginal ultrasound and CA-125 every 6 months starting at age 30, or 5-10 years before the earliest diagnosis in the family.   Consider risk-reducing salpingo-oophorectomy between ages 35 and 40 for BRCA1, between ages 40-45 for BRCA2.  Consider chemoprevention for breast cancer risk reduction (tamoxifen) and ovarian cancer (oral contraceptives).  Consider risk-reducing mastectomy.        Discussed the purpose of high risk clinic in coordinating, scheduling, and planning screening interventions to include yearly mammograms, yearly screening breast MRI, Q6 month CBE, and monthly SBE. Colonoscopy and GYN screening per national guidelines.   Reviewed technique for SBE including concerning findings to report.   Discussed twice yearly CBE.   Discussed yearly screening mammogram. Results will be reported by breast imaging center and a copy of report will be sent to our office for review. Mammography plans will be determined by treating oncology team for now, we will follow their recommendations.   Discussed screening breast MRI scheduling based on menstrual cycles, HRT. We also discussed the increased sensitivity of MRI screening and possibility of call- backs for additional imaging or biopsies to establish baseline.  Discussed every 6 month TVUS and CA-125 for ovarian cancer screening.   If abnormal findings on imaging or elevated tumor marker, will recommend diagnostic imaging with CT.   Discussed vitamin D as benefit to bone health and possible benefit to breast health. Recommended 1000 IU daily unless contraindicated  Discussed benefit of Vitamin E for fibrocystic breasts/breast pain  Discussed chemoprevention options. Patient currently prescribed exemestane for hormone blockade for ER/TX+ breast cancer maintenance. Agree with plan from a preventative standpoint-- not taking.  Discussed prophylactic surgeries including bilateral  mastectomies and bilateral salpingo-oophrectomy. We discussed potential for risk reduction as well as risks of surgery. The patient declines surgical intervention at this time,  but the options were presented to her as education and she knows she can pursue surgical intervention if she desires in the future. She notes she will consider this further in the future or if problems arise in screening imaging.       Keep all follow-ups with oncology team.    Pain assessment was performed today as a part of patient’s care. For patients with pain related to surgery, gynecologic malignancy or cancer treatment, the plan is as noted in the assessment/plan.  For patients with pain not related to these issues, they are to seek any further needed care from a more appropriate provider, such as PCP.      I spent 35 minutes caring for Carol on this date of service. This time includes time spent by me in the following activities: reviewing tests, obtaining and/or reviewing a separately obtained history, performing a medically appropriate examination and/or evaluation, counseling and educating the patient/family/caregiver, ordering medications, tests, or procedures, referring and communicating with other health care professionals, documenting information in the medical record, independently interpreting results and communicating that information with the patient/family/caregiver, and care coordination.       Return to clinic in 6 months for High Risk visit.      Electronically signed by ARACELI Patterson on 05/19/24 at 01:23 EDT

## 2024-05-20 ENCOUNTER — HOSPITAL ENCOUNTER (OUTPATIENT)
Dept: GENERAL RADIOLOGY | Facility: HOSPITAL | Age: 69
Discharge: HOME | End: 2024-05-20
Admitting: INTERNAL MEDICINE
Payer: MEDICARE

## 2024-05-20 ENCOUNTER — TRANSCRIBE ORDERS (OUTPATIENT)
Dept: GENERAL RADIOLOGY | Facility: HOSPITAL | Age: 69
End: 2024-05-20
Payer: MEDICARE

## 2024-05-20 DIAGNOSIS — J40 BRONCHITIS: ICD-10-CM

## 2024-05-20 DIAGNOSIS — J40 BRONCHITIS: Primary | ICD-10-CM

## 2024-05-20 PROCEDURE — 71046 X-RAY EXAM CHEST 2 VIEWS: CPT

## 2024-07-25 ENCOUNTER — OFFICE VISIT (OUTPATIENT)
Dept: ONCOLOGY | Facility: CLINIC | Age: 69
End: 2024-07-25
Payer: MEDICARE

## 2024-07-25 VITALS
DIASTOLIC BLOOD PRESSURE: 78 MMHG | WEIGHT: 194 LBS | HEIGHT: 63 IN | TEMPERATURE: 98 F | OXYGEN SATURATION: 93 % | RESPIRATION RATE: 16 BRPM | HEART RATE: 92 BPM | SYSTOLIC BLOOD PRESSURE: 123 MMHG | BODY MASS INDEX: 34.38 KG/M2

## 2024-07-25 DIAGNOSIS — C50.411 MALIGNANT NEOPLASM OF UPPER-OUTER QUADRANT OF RIGHT BREAST IN FEMALE, ESTROGEN RECEPTOR POSITIVE: ICD-10-CM

## 2024-07-25 DIAGNOSIS — Z15.02 BRCA2 GENE MUTATION POSITIVE IN FEMALE: Primary | ICD-10-CM

## 2024-07-25 DIAGNOSIS — Z17.0 MALIGNANT NEOPLASM OF UPPER-OUTER QUADRANT OF RIGHT BREAST IN FEMALE, ESTROGEN RECEPTOR POSITIVE: ICD-10-CM

## 2024-07-25 DIAGNOSIS — Z15.09 BRCA2 GENE MUTATION POSITIVE IN FEMALE: Primary | ICD-10-CM

## 2024-07-25 DIAGNOSIS — Z15.01 BRCA2 GENE MUTATION POSITIVE IN FEMALE: Primary | ICD-10-CM

## 2024-07-25 PROCEDURE — 99214 OFFICE O/P EST MOD 30 MIN: CPT | Performed by: INTERNAL MEDICINE

## 2024-07-25 PROCEDURE — 1126F AMNT PAIN NOTED NONE PRSNT: CPT | Performed by: INTERNAL MEDICINE

## 2024-07-25 RX ORDER — FLUTICASONE PROPIONATE 50 MCG
SPRAY, SUSPENSION (ML) NASAL
COMMUNITY
Start: 2024-01-09

## 2024-07-25 RX ORDER — FEZOLINETANT 45 MG/1
45 TABLET, FILM COATED ORAL EVERY 24 HOURS
Qty: 30 TABLET | Refills: 11 | Status: SHIPPED | OUTPATIENT
Start: 2024-07-25

## 2024-07-25 RX ORDER — FEXOFENADINE HCL 180 MG/1
180 TABLET ORAL DAILY
COMMUNITY

## 2024-07-25 RX ORDER — ALBUTEROL SULFATE 90 UG/1
AEROSOL, METERED RESPIRATORY (INHALATION)
COMMUNITY
Start: 2024-05-24

## 2024-07-25 RX ORDER — CETIRIZINE HYDROCHLORIDE 10 MG/1
TABLET ORAL
COMMUNITY
Start: 2024-01-09

## 2024-07-25 RX ORDER — MECLIZINE HYDROCHLORIDE 25 MG/1
TABLET ORAL
COMMUNITY
Start: 2024-01-09

## 2024-07-25 RX ORDER — VALACYCLOVIR HYDROCHLORIDE 1 G/1
TABLET, FILM COATED ORAL
COMMUNITY
Start: 2024-07-17

## 2024-07-25 NOTE — PROGRESS NOTES
"      PROBLEM LIST:  1. [id number]+ (100%) [place]+ (100%) Her2 negative (0+) invasive ductal carcinoma of the right breast  A) right breast lumpectomy on 9/9/22.  Pathology showed a 1.4 cm intermediate grade IDC.  0/1 SLN involved.  oncotype score 16.  B) Completed radiation 12/6/2022. Started anastrozole 1/4/2023.  Anastrozole stopped 4/2023 d/t intolerance.  Started exemestane 9/7/2023. Exemestane stopped 11/2023 d/t intolerance.   Declined tamoxifen due to history of DVT.  2. BRCA2 mutation    Subjective     CHIEF COMPLAINT: breast cancer    HISTORY OF PRESENT ILLNESS:   Carol Nayak returns for follow-up.          Objective      /78   Pulse 92   Temp 98 °F (36.7 °C)   Resp 16   Ht 160 cm (62.99\")   Wt 88 kg (194 lb)   LMP  (LMP Unknown)   SpO2 93%   BMI 34.37 kg/m²    Vitals:    07/25/24 1456   PainSc: 0-No pain               ECOG score: 0             General: well appearing female in no acute distress  Neuro: alert and oriented  HEENT: sclera anicteric, oropharynx clear  Extremeties: no lower extremity edema  Skin: no rashes, lesions, bruising, or petechiae  Psych: mood and affect appropriate    I have reexamined the patient and the results are consistent with the previously documented exam. Amanda Solano MD        RECENT LABS:  Lab Results   Component Value Date    WBC 7.13 08/31/2022    HGB 14.1 08/31/2022    HCT 41.8 08/31/2022    MCV 85.3 08/31/2022     08/31/2022       Lab Results   Component Value Date    GLUCOSE 73 08/31/2022    BUN 16 08/31/2022    CREATININE 0.90 10/17/2023    BCR 17.8 08/31/2022    K 4.9 08/31/2022    CO2 28.9 08/31/2022    CALCIUM 9.2 08/31/2022    ALBUMIN 3.90 08/31/2022    AST 21 08/31/2022    ALT 22 08/31/2022                 ASSESSMENT AND PLAN:     Carol Nayak is a 68 y.o. female  with a stage IA ER+ Her2 negative IDC of the right breast.    She had a low risk Oncotype and did not require chemotherapy.  She completed adjuvant radiotherapy to the right " breast 12/6/2022.  She was unable to tolerate aromatase inhibitor therapy, and is not a candidate for tamoxifen due to a prior DVT.    Mammogram will be due in February 2025..  Breast MRI is due in October 2024.  Ordered today.    BRCA2 mutation: Continue mammograms and breast MRI.  She will continue to be followed in the high-risk clinic.    Hot flashes: She is already on Effexor 150 mg without benefit.  She is not a candidate for hormonal therapy for this.  I will send a prescription for Veozah to her pharmacy.  She will need labs  in 3 months to check LFTs.     Follow-up in 6 months.                  Amanda Solano MD  [facility]    7/25/2024          CC:

## 2024-08-14 LAB — NCCN CRITERIA FLAG: ABNORMAL

## 2024-08-15 NOTE — PROGRESS NOTES
This patient recently took the CARE risk assessment as part of their mammogram appointment. Based on the patient's responses, NCCN criteria for genetic testing was met.     Navigator follow-up:   The patient had genetic counseling and genetic testing in 2022.  The CancerNext panel was ordered through [employer] which analyzes BRCA1/2 and 34 additional genes associated with an increased cancer risk. Results from the BRCAPlus panel, the high/moderate breast cancer risk portion of the panel, were resulted out first in order to expedite surgical decision making. Testing was positive for a pathogenic mutation in the BRCA2 gene ([id number]).  No additional testing is indicated at this time.

## 2024-08-22 ENCOUNTER — HOSPITAL ENCOUNTER (OUTPATIENT)
Dept: MAMMOGRAPHY | Facility: HOSPITAL | Age: 69
Discharge: HOME | End: 2024-08-22
Admitting: RADIOLOGY
Payer: MEDICARE

## 2024-08-22 DIAGNOSIS — R92.8 ABNORMAL MAMMOGRAM OF BOTH BREASTS: ICD-10-CM

## 2024-08-22 PROCEDURE — G0279 TOMOSYNTHESIS, MAMMO: HCPCS

## 2024-08-22 PROCEDURE — 77066 DX MAMMO INCL CAD BI: CPT
